# Patient Record
Sex: MALE | Race: WHITE | Employment: OTHER | URBAN - METROPOLITAN AREA
[De-identification: names, ages, dates, MRNs, and addresses within clinical notes are randomized per-mention and may not be internally consistent; named-entity substitution may affect disease eponyms.]

---

## 2021-06-01 ENCOUNTER — OFFICE VISIT (OUTPATIENT)
Dept: ORTHOPEDIC SURGERY | Age: 71
End: 2021-06-01
Payer: COMMERCIAL

## 2021-06-01 VITALS — BODY MASS INDEX: 29.35 KG/M2 | WEIGHT: 205 LBS | HEIGHT: 70 IN

## 2021-06-01 DIAGNOSIS — G89.29 CHRONIC RIGHT SHOULDER PAIN: Primary | ICD-10-CM

## 2021-06-01 DIAGNOSIS — M25.511 CHRONIC RIGHT SHOULDER PAIN: Primary | ICD-10-CM

## 2021-06-01 DIAGNOSIS — M25.511 ACUTE PAIN OF RIGHT SHOULDER: ICD-10-CM

## 2021-06-01 PROBLEM — M79.601 PAIN OF RIGHT UPPER EXTREMITY: Status: ACTIVE | Noted: 2020-05-19

## 2021-06-01 PROBLEM — E11.69 HYPERLIPIDEMIA ASSOCIATED WITH TYPE 2 DIABETES MELLITUS (HCC): Status: ACTIVE | Noted: 2019-09-10

## 2021-06-01 PROBLEM — E11.9 TYPE 2 DIABETES MELLITUS WITHOUT COMPLICATION, WITHOUT LONG-TERM CURRENT USE OF INSULIN (HCC): Status: ACTIVE | Noted: 2018-11-05

## 2021-06-01 PROBLEM — E78.5 HYPERLIPIDEMIA ASSOCIATED WITH TYPE 2 DIABETES MELLITUS (HCC): Status: ACTIVE | Noted: 2019-09-10

## 2021-06-01 PROBLEM — M50.30 DEGENERATIVE DISC DISEASE, CERVICAL: Status: ACTIVE | Noted: 2018-01-01

## 2021-06-01 PROCEDURE — 99203 OFFICE O/P NEW LOW 30 MIN: CPT | Performed by: ORTHOPAEDIC SURGERY

## 2021-06-01 RX ORDER — BLOOD SUGAR DIAGNOSTIC
STRIP MISCELLANEOUS
COMMUNITY
Start: 2021-03-22

## 2021-06-01 RX ORDER — ATORVASTATIN CALCIUM 10 MG/1
TABLET, FILM COATED ORAL
COMMUNITY
Start: 2020-11-02 | End: 2021-06-01

## 2021-06-01 RX ORDER — LANCETS
EACH MISCELLANEOUS
COMMUNITY
Start: 2021-03-22

## 2021-06-01 RX ORDER — LISINOPRIL 2.5 MG/1
2.5 TABLET ORAL DAILY
COMMUNITY
Start: 2021-05-03

## 2021-06-01 NOTE — PROGRESS NOTES
[facility] and [facility]  Office Visit  Shoulder Pain  Date:  2021    Name:  Mac Snellen  Address:  [address] [place] [address]    :  1950      Age:   65 y.o.    SSN:  [id number]      Medical Record Number:  <[id number]>    Chief Complaint:    Right shoulder Pain    HPI:   Mac Snellen is a 65 y.o. left hand dominant male who presents today for new patient evaluation regarding his right shoulder. 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 The patient reports that about a year ago he was carrying a heavy [place] countertop and felt a pain to his right shoulder. He was evaluated at the [facility] a few months ago and a corticosteroid injection was performed which did improve some of his symptoms. They told him that he will either need to live with this problem or get surgical intervention. The patient was then working yesterday and pulled his arm back during a job and felt immediate pain to his right shoulder that was worse than it was before. Since then he has had continuing pain and weakness to the shoulder that was not present prior to this injury yesterday. Prior to that injury he was able to sleep okay and he was able to do a lot of work with the shoulder below shoulder height, only having problems with work above shoulder height. Now he has problems with any motion of his shoulder in any plane. Pain Assessment  Location of Pain: Shoulder  Location Modifiers: Right  Severity of Pain: 0  Quality of Pain: Sharp  Duration of Pain: Persistent  Frequency of Pain: Intermittent  Aggravating Factors:  Other (Comment)  Limiting Behavior: Yes  Relieving Factors: Rest, Other (Comment)  Result of Injury: Yes  Work-Related Injury: No  Are there other pain locations you wish to document?: No    Past History:  Past Medical History:   Diagnosis Date    Diabetes [facility])        Past Surgical History:   Procedure Laterality Date    CARPAL TUNNEL RELEASE         Social History     Tobacco Use    Smoking status: Never Smoker    Smokeless tobacco: Never Used   Substance Use Topics    Alcohol use: Yes    Drug use: Never        Family History:  family history is not on file. Current Outpatient Medications:     metFORMIN (GLUCOPHAGE) 500 MG tablet, , Disp: , Rfl:     lisinopril (PRINIVIL;ZESTRIL) 2.5 MG tablet, , Disp: , Rfl:     Aspirin Buf,CaCarb-MgCarb-MgO, 81 MG TABS, Take 81 mg by mouth daily, Disp: , Rfl:     ACCU-CHEK GUIDE strip, , Disp: , Rfl:     Accu-Chek Softclix Lancets MISC, , Disp: , Rfl:     Allergies   Allergen Reactions    Penicillins          Review of Systems: A 14 point review of systems available in the scanned medical record as documented by the patient on 6/1/21. Today's review pertinent items are noted in HPI. Musculoskeletal ROS: positive for - pain in right shoulder        Physical Exam:  Ht 5' 10\" (1.778 m)   Wt 205 lb (93 kg)   BMI 29.41 kg/m²       General: No acute distress, well nourished  CV: No obvious peripheral edema. Normal peripheral pulses  Neuro: Alert & oriented x 3  Psych: Normal mood and affect    Right shoulder exam    Inspection:  Held in a normal posture. Normal contour at the acromioclavicular joint. No swelling, ecchymosis, or erythema about the shoulder. No atrophy appreciated. No scapular winging. Palpation:  No subacromial crepitus. No tenderness of the AC joint. Tenderness to the greater tuberosity into the bicipital groove. Range of Motion: Active ROM: Forward flexion 20 degrees, passive 140, external rotation 20 degrees, passive 45, internal rotation T10. Strength: 3 out of 5 supraspinatus, 4 out of 5 infraspinatus, 5 out of 5 subscapularis    Stability: No anterior instability. No posterior instability. Special Tests: Impingement findings are negative. Labral findings are negative. Speed sign and Yergason signs are both negative. Crossover sign is negative. Belly press sign is negative. Lift off sign is negative. Other findings: The skin is warm dry and well perfused. 2+ radial pulse. Sensation is intact to light touch over the deltoid. Left comparison shoulder exam    Inspection:  Held in a normal posture. Normal contour at the acromioclavicular joint. No swelling, ecchymosis, or erythema about the shoulder. No atrophy appreciated. No scapular winging. Palpation:  No subacromial crepitus. No tenderness of the AC joint. No greater tuberosity tenderness. No tenderness in the bicipital groove. Range of Motion: Active ROM forward flexion 150 degrees, external rotation 45 degrees, internal rotation T10. Normal scapulothoracic rhythm. Strength:  Normal supraspinatus, infraspinatus, and subscapularis muscle strength. Stability: No anterior instability. No posterior instability. Special Tests: Impingement findings are negative. Labral findings are negative. Speed sign and Yergason signs are both negative. Crossover sign is negative. Belly press sign is negative. Lift off sign is negative. Other findings: The skin is warm dry and well perfused. 2+ radial pulse. Sensation is intact to light touch over the deltoid. Radiographic:  X-rays obtained and reviewed in office, reviewed and interpreted by me today:    Findings:   Views: 3 views right shoulder  Weight bearing: No  Findings: 3 views of the right shoulder taken in the office today demonstrate no acute osseous abnormalities. There is well-maintained joint space to the glenohumeral joint without signs of arthritic change. There is mild to moderate AC joint arthritis. Appropriate acromiohumeral interval noted. Previous comparison films: None    Impression:  1. Mild to moderate right shoulder AC joint arthritis    Assessment:  Cesia Wilson is a 65 y.o. male presenting with:  1.  Right shoulder pain and weakness, concern for acute on chronic rotator cuff full-thickness tear    Impression:  Encounter Diagnoses   Name Primary?  Chronic right shoulder pain Yes    Acute pain of right shoulder        Office Procedures:  Orders Placed This Encounter   Procedures    XR SHOULDER RIGHT (MIN 2 VIEWS)     Standing Status:   Future     Number of Occurrences:   1     Standing Expiration Date:   6/1/2022    MRI SHOULDER RIGHT WO CONTRAST     Standing Status:   Future     Standing Expiration Date:   6/1/2022     Order Specific Question:   Reason for exam:     Answer:   Jeananne Goltz       Plan: We had a candid discussion with Marcin Sharp. Given the patient's history of an acute injury yesterday and clinical exam findings suspicious for full-thickness rotator cuff tear, which is different than what his shoulder was 2 days ago, we are concerned for an acute on chronic rotator cuff tear. This may be a fixable tear given the good status of his overall joint. Therefore we recommend he get an MRI to evaluate his right shoulder. We will try to get it set up at the [place]. Follow-up with us next week to go over the MRI results. If there is a full-thickness tear in the muscle is not atrophied he may be a good candidate for arthroscopic repair. Joaquim Armendariz,   [facility] Clinical Fellow    The encounter with Marcin Sharp was supervised by Dr. Joel Horn who personally examined the patient and reviewed the plan. This dictation was performed with a verbal recognition program (DRAGON) and it was checked for errors. It is possible that there are still dictated errors within this office note. If so, please bring any errors to my attention for an addendum. All efforts were made to ensure that this office note is accurate.   ___________________  I was physically present and personally supervised the Orthopaedic Sports Medicine Fellow in the evaluation and development of a treatment plan for this patient. I personally interviewed the patient and performed a physical examination.  In addition,

## 2021-06-04 ENCOUNTER — TELEPHONE (OUTPATIENT)
Dept: ORTHOPEDIC SURGERY | Age: 71
End: 2021-06-04

## 2021-06-04 NOTE — TELEPHONE ENCOUNTER
Test Results     Type of Test: MRI RT SHOULDER  Date of Test: Rekha 3, 2021  Location of Test: [id number]  Patient Contact Number: [phone]    PATIENT CALLING REGARDING SCHEDULING APPOINTMENT FOR TR MRI  RT SHOULDER    1ST AVAIL IN [facility] July 13, 2021 AND [place] June 22, 2021. PATIENT STATED THAT HE THINK DR. GEE WANTS TO SEE HIM SOONER.     PLEASE CALL BACK PATIENT AT THE ABOVE NUMBER

## 2021-06-17 ENCOUNTER — OFFICE VISIT (OUTPATIENT)
Dept: ORTHOPEDIC SURGERY | Age: 71
End: 2021-06-17
Payer: COMMERCIAL

## 2021-06-17 VITALS — HEIGHT: 70 IN | WEIGHT: 205 LBS | BODY MASS INDEX: 29.35 KG/M2

## 2021-06-17 DIAGNOSIS — M25.511 CHRONIC RIGHT SHOULDER PAIN: Primary | ICD-10-CM

## 2021-06-17 DIAGNOSIS — M25.511 ACUTE PAIN OF RIGHT SHOULDER: ICD-10-CM

## 2021-06-17 DIAGNOSIS — M75.121 NONTRAUMATIC COMPLETE TEAR OF RIGHT ROTATOR CUFF: ICD-10-CM

## 2021-06-17 DIAGNOSIS — G89.29 CHRONIC RIGHT SHOULDER PAIN: Primary | ICD-10-CM

## 2021-06-17 PROCEDURE — 99214 OFFICE O/P EST MOD 30 MIN: CPT | Performed by: ORTHOPAEDIC SURGERY

## 2021-06-17 NOTE — PROGRESS NOTES
[facility]  History and Physical  Shoulder Pain    Date:  2021    Name:  Sheridan Rivero  Address:  [address] [place] [address]    :  1950      Age:   65 y.o.    SSN:  [id number]      Medical Record Number:  [id number]    Reason for Visit:    Shoulder Pain (F/U RIGHT SHOULDER MRI)      HPI:   Sheridan Rivero is a 65 y.o. male who presents to our office today for follow up of the right shoulder pain. Patient presents today review the results of a more recent MRI that he completed which was done to evaluate for the muscle atrophy of the rotator cuff. Fortunately he has been dealing with this right shoulder for nearly a year. He injured his right shoulder after he was carrying a heavy [place] countertop when he felt pain in the right shoulder. He was evaluated at the [place] initially and was undergoing conservative management. He eventually did get an MRI in November of last year which did show full-thickness rotator cuff tear. Today he reports he continues to have pain and weakness with any overhead activities, lifting and carrying. He also has pain at nighttime when he sleeping. He denies any new injury since his last visit with us. Of note the patient is currently on chronic oral prednisone for multiple joint pain and is tapering down monthly by 2 mg. He is currently on 12.5 mg dosing. Pain Assessment  Location of Pain: Shoulder  Location Modifiers: Right  Severity of Pain: 2  Quality of Pain: Aching  Duration of Pain: Persistent  Frequency of Pain: Constant  Aggravating Factors:  Other (Comment) (GENERAL USE)  Limiting Behavior: Yes  Relieving Factors: Rest  Result of Injury: No  Work-Related Injury: No  Are there other pain locations you wish to document?: No    Patient has had no medical changes since last evaluated        Review of Systems:  A 14 point review of systems available in the scanned medical record as documented by the patient on/1/2021. The review is negative with the exception of those things mentioned in the History of Present Illness and Past Medical History. Past Medical History:  Patient's medications, allergies, past medical, surgical, social and family histories were reviewed and updated as appropriate. Allergies: Allergies   Allergen Reactions    Penicillins        Physical Exam:  There were no vitals filed for this visit. General: Saurav Garcia is a healthy and well appearing 65 y.o. male who is sitting comfortably in our office in acute distress. Skin:  There are no skin lesions, cellulitis, or extreme edema. The patient has warm and well-perfused Bilateral upper extremities with brisk capillary refill. Eyes: Extra-ocular muscles intact  Mouth: Oral mucosa moist. No perioral lesions  Pulm: Respirations unlabored and regular. Neuro: Alert and oriented x3    Right shoulder Exam:  Inspection:  No gross deformities, no signs of infection. Palpation: He has tenderness over the rotator cuff footprint    Active Range of Motion: Forward elevation of 140, abduction of 40, external rotation with elbow at the side 40, internal rotation to the back is T6    Passive Range of Motion: Passively forward elevation can be further increased to 150. Strength: External rotation with resistance with elbow at the side 4/5, internal rotation with resistance with elbow at the side +4/5, 4/5 champagne test    Special Tests:   No Erich muscle deformity. Neurovascular: Sensation to light touch is intact, no motor deficits, palpable radial pulses 2+    Additional Examinations:    Examination of the contralateral extremity does not show any tenderness, deformity or injury. Range of motion is unremarkable. There is no gross instability. There are no rashes, ulcerations or lesions.  Strength and tone are normal.      Radiographic:  MRI right shoulder 6/4/2021  FINDINGS:  No acute fracture or contusion. Ashish Fang high-riding of humeral head.  Chronic near    circumferential labral tear, no paralabral cyst.  No substantive joint effusion.       Chronic 2.5 x 3.5 cm full-thickness insertional tear of anterior supraspinatus tendon.     Tendinopathy with complex interstitial strain of infraspinatus tendon and muscle.  No hematoma.     Moderate tendinopathy of subscapularis, no tear.  Moderate tendinopathy of arcuate segment of    biceps long head tendon, no discrete tear.       Moderate hypertrophic AC arthropathy.  Acromion type 2.  Hypertrophy of coracoacromial    ligament.       Mild fatty infiltration of supraspinatus muscle.  Moderate fatty infiltration of infraspinatus    muscle.  No acute muscle strain.       CONCLUSION:   1. Chronic 2.5 x 3.5 cm full-thickness insertional tear of anterior supraspinatus tendon.     Severe tendinopathy with complex interstitial tear of infraspinatus tendon and muscle. 2. Moderate tendinopathy of subscapularis and arcuate segment biceps long head tendon, no tear. 3. Moderate hypertrophic AC arthropathy. 4. Mild glenohumeral arthrosis with spurring and near circumferential labral tear.             Assessment:  Dariana Osorio is a 65 y.o. male with right shoulder pain and weakness related to a full-thickness rotator cuff tear with mild muscle atrophy based on his MRI. Impression:  Encounter Diagnoses   Name Primary?  Chronic right shoulder pain Yes    Acute pain of right shoulder     Nontraumatic complete tear of right rotator cuff        Office Procedures:  No orders of the defined types were placed in this encounter. Plan: We were able to review the most recent MRI with Dariana Osorio today. At this point we recommend that the patient has a rotator cuff tear that can be repaired with a right shoulder arthroscopy.   Unfortunately the patient is currently on 12.5 mg of oral prednisone daily and this will need to be tapered down to under 5 mg for him to undergo the surgery. Patient will speak to his physician at the [address] and will find out how long this prednisone taper will last.  We recommend that he return back to our office in 6 weeks and start planning on his surgery 3 months from now. He was agreeable to the above plan. All his questions were fully answered today. Greater than 30 minutes were expended during all aspects of today's visit. 6/17/2021  11:34 AM      Lianet Terry PA-C  Orthopaedic Sports Medicine Physician Assistant    During this examination, I, Lianet Terry PA-C, functioned as a scribe for Dr. Suzy Nicole. This dictation was performed with a verbal recognition program (DRAGON) and it was checked for errors. It is possible that there are still dictated errors within this office note. If so, please bring any errors to my attention for an addendum. All efforts were made to ensure that this office note is accurate.  _________________  I, Dr. Suzy Nicole, personally performed the services described in this documentation as described by Lianet Terry PA-C in my presence, and it is both accurate and complete. Kwame Hannah MD, PhD  6/17/2021

## 2021-07-29 ENCOUNTER — OFFICE VISIT (OUTPATIENT)
Dept: ORTHOPEDIC SURGERY | Age: 71
End: 2021-07-29
Payer: COMMERCIAL

## 2021-07-29 VITALS — WEIGHT: 205 LBS | BODY MASS INDEX: 29.35 KG/M2 | HEIGHT: 70 IN

## 2021-07-29 DIAGNOSIS — S46.011A TRAUMATIC COMPLETE TEAR OF RIGHT ROTATOR CUFF, INITIAL ENCOUNTER: Primary | ICD-10-CM

## 2021-07-29 DIAGNOSIS — M25.511 RIGHT SHOULDER PAIN, UNSPECIFIED CHRONICITY: ICD-10-CM

## 2021-07-29 PROCEDURE — 99214 OFFICE O/P EST MOD 30 MIN: CPT | Performed by: ORTHOPAEDIC SURGERY

## 2021-07-29 NOTE — PROGRESS NOTES
daily, Disp: , Rfl:     ACCU-CHEK GUIDE strip, , Disp: , Rfl:     Accu-Chek Softclix Lancets MISC, , Disp: , Rfl:       Allergies   Allergen Reactions    Penicillins          Review of Systems: A 14 point review of systems was completed by the patient on 6/1/21 and is available in the media section of the scanned medical record and was reviewed today  The review is negative with the exception of those things mentioned in the HPI    Patient has had no medical changes since last evaluated        Physical Exam:  Ht 5' 10\" (1.778 m)   Wt 205 lb (93 kg)   BMI 29.41 kg/m²       General: No acute distress, well nourished  CV: No obvious peripheral edema. Normal peripheral pulses  Resp: nonlabored respirations, symmetrical chest expansion  Neuro: Alert & oriented x 3  Psych: Appropriate mood and affect    R Shoulder Exam:  Inspection: No gross deformities, no signs of infection. Palpation: No tenderness to palpation  Active Range of Motion: Forward Elevation 160, Abduction 160, External Rotation 45, Internal Rotation t12  Passive Range of Motion: No restrictions  Strength:  External Rotation 4-/5, Internal Rotation 4+/5  Special Tests:  No Erich muscle deformity. 4/5 Twyla test. 4/5 Champagne toast test  Neurovascular: Sensation to light touch is intact, no motor deficits, palpable radial pulses 2+      Contralateral L Shoulder Exam:  Inspection: No gross deformities, no signs of infection. Palpation: No tenderness to palpation  Active Range of Motion: Forward Elevation 160, Abduction 160, External Rotation 45, Internal Rotation t12  Passive Range of Motion: No restrictions  Strength:  External Rotation 5/5, Internal Rotation 5/5  Special Tests:  No Erich muscle deformity. Neurovascular: Sensation to light touch is intact, no motor deficits, palpable radial pulses 2+             Radiographic:  MRI R shoulder 6/4/21    1.  Chronic 2.5 x 3.5 cm full-thickness insertional tear of anterior supraspinatus tendon.   Severe tendinopathy with complex interstitial tear of infraspinatus tendon and muscle. 2. Moderate tendinopathy of subscapularis and arcuate segment biceps long head tendon, no tear. 3. Moderate hypertrophic AC arthropathy. 4. Mild glenohumeral arthrosis with spurring and near circumferential labral tear. Assessment:  Sheridan Thibodeaux is a 65 y.o. male with right shoulder rotator cuff tear. Probably sustained 1 year ago after a traumatic event. He is currently on an extended steroid taper for polymyalgia rheumatica which is delaying his surgery    Office Procedures:  No orders of the defined types were placed in this encounter. Plan:     - We discussed the timeline to definitive treatment is considerable length today with Sheridan Thibodeaux. -Given that this is a full-thickness tear and he has associated weakness and pain, he would benefit from rotator cuff repair. He is currently on prednisone 10 mg daily for polymyalgia rheumatica. He will transition to 7.5 mg daily for 2 months and then transition to 5 mg in November timeframe. Once he transitions to 5 mg we will proceed with rotator cuff repair to prevent further muscle atrophy and retraction of the tendon which would prevent repair  -He has been treated by a [place] physician for his polymyalgia rheumatica  -He will return back in October timeframe for surgical planning.  -He lives alone. Discussed that he would require help from his sisters during the immediate postoperative period while he is in a sling for the first 3 weeks  -We will plan on doing physical therapy with [facility]    Total time spent for evaluation, education, and development of treatment plan: 30 minutes    Luke Garrett MD  Orthopaedic Fellow  [facility] and [facility]      The encounter with Sheridan Thibodeaux was supervised by Dr Elian Sinha  who personally examined the patient and reviewed the plan.      This dictation was performed with a verbal recognition program (DRAGON) and it was checked for errors. It is possible that there are still dictated errors within this office note. If so, please bring any errors to my attention for an addendum. All efforts were made to ensure that this office note is accurate.   ____________  I was physically present and personally supervised the Orthopaedic Sports Medicine Fellow in the evaluation and development of a treatment plan for this patient. I personally interviewed the patient and performed a physical examination. In addition, I discussed the patient's condition and treatment options with them. I have also reviewed and agree with the past medical, family and social history unless otherwise noted. All of the patient's questions were answered. Kwame Gordon MD, PhD  7/29/2021

## 2021-10-26 ENCOUNTER — TELEPHONE (OUTPATIENT)
Dept: ORTHOPEDIC SURGERY | Age: 71
End: 2021-10-26

## 2021-10-26 ENCOUNTER — OFFICE VISIT (OUTPATIENT)
Dept: ORTHOPEDIC SURGERY | Age: 71
End: 2021-10-26
Payer: COMMERCIAL

## 2021-10-26 VITALS — BODY MASS INDEX: 29.35 KG/M2 | HEIGHT: 70 IN | WEIGHT: 205 LBS

## 2021-10-26 DIAGNOSIS — M25.532 LEFT WRIST PAIN: ICD-10-CM

## 2021-10-26 DIAGNOSIS — S46.011D TRAUMATIC COMPLETE TEAR OF RIGHT ROTATOR CUFF, SUBSEQUENT ENCOUNTER: Primary | ICD-10-CM

## 2021-10-26 PROCEDURE — 99214 OFFICE O/P EST MOD 30 MIN: CPT | Performed by: ORTHOPAEDIC SURGERY

## 2021-10-26 PROCEDURE — L3670 SO ACRO/CLAV CAN WEB PRE OTS: HCPCS | Performed by: ORTHOPAEDIC SURGERY

## 2021-10-26 RX ORDER — ROSUVASTATIN CALCIUM 10 MG/1
10 TABLET, COATED ORAL DAILY
COMMUNITY
Start: 2021-10-15

## 2021-10-26 NOTE — PROGRESS NOTES
[address] [facility]  Office Visit  Follow up  Date:  10/26/2021    Name:  Duane Chad  Address:  [address] [place] [address]    :  1950      Age:   65 y.o.    SSN:  [id number]      Medical Record Number:  <[id number]>    Chief Complaint:    Follow up for R shoulder  HPI:   Duane Chad is a 65 y.o. male who is following up for his right shoulder. He has a right shoulder full-thickness rotator cuff tear confirmed on MRI. He is currently on extended prednisone taper for polymyalgia rheumatica. He still have shoulder pain 4/10 in severity. He has difficulty in wearing his shirt and doing daily activities. He has difficulty sleeping at night at his right shoulder. He had received steroid injection in his shoulder 3 months ago, and his symptoms briefly improved but returned back again. Patient also had left wrist injury 4 days ago. He sustained pain at the ulnar aspect of his wrist. Pain increased with any twisting movement at his wrist. He is taking pain medication and using wrist support for that. He denies any new numbness, tingling, fevers, chills, chest pain, shortness of breath, or any other new significant symptoms. Pain Assessment  Location of Pain: Shoulder  Location Modifiers: Right  Severity of Pain: 0  Frequency of Pain: Intermittent  Aggravating Factors: Other (Comment)  Limiting Behavior: Some  Relieving Factors: Rest, Ice, Exercise  Work-Related Injury: No  Are there other pain locations you wish to document?: No    Past History:  Past Medical History:   Diagnosis Date    Diabetes ([address]        Past Surgical History:   Procedure Laterality Date    CARPAL TUNNEL RELEASE         Social History     Tobacco Use    Smoking status: Never Smoker    Smokeless tobacco: Never Used   Substance Use Topics    Alcohol use: Yes    Drug use: Never        Family History:  family history is not on file.          Current Outpatient Medications:     rosuvastatin (CRESTOR) 10 MG tablet, , Disp: , Rfl:     metFORMIN (GLUCOPHAGE) 500 MG tablet, , Disp: , Rfl:     lisinopril (PRINIVIL;ZESTRIL) 2.5 MG tablet, , Disp: , Rfl:     Aspirin Buf,CaCarb-MgCarb-MgO, 81 MG TABS, Take 81 mg by mouth daily, Disp: , Rfl:     ACCU-CHEK GUIDE strip, , Disp: , Rfl:     Accu-Chek Softclix Lancets MISC, , Disp: , Rfl:       Allergies   Allergen Reactions    Penicillins          Review of Systems: A 14 point review of systems was completed by the patient on 6/1/21 and is available in the media section of the scanned medical record and was reviewed today  The review is negative with the exception of those things mentioned in the HPI    Patient has had no medical changes since last evaluated        Physical Exam:  Ht 5' 10\" (1.778 m)   Wt 205 lb (93 kg)   BMI 29.41 kg/m²       General: No acute distress, well nourished  CV: No obvious peripheral edema. Normal peripheral pulses  Resp: nonlabored respirations, symmetrical chest expansion  Neuro: Alert & oriented x 3  Psych: Appropriate mood and affect    R Shoulder Exam:  Inspection: No gross deformities, no signs of infection. Palpation: No tenderness to palpation on biceps or AC joint  Active Range of Motion: Forward Elevation 160, Abduction 160, External Rotation 45, Internal Rotation t10  Passive Range of Motion: No restrictions  Strength:  External Rotation 4/5, Internal Rotation 4+/5  Special Tests:  No Erich muscle deformity. 4/5 Twyla test. 4/5 Champagne toast test with pain  Neurovascular: Sensation to light touch is intact, no motor deficits, palpable radial pulses 2+      Radiographic:  MRI R shoulder 6/4/21    1. Chronic 2.5 x 3.5 cm full-thickness insertional tear of anterior supraspinatus tendon.     Severe tendinopathy with complex interstitial tear of infraspinatus tendon and muscle. 2. Moderate tendinopathy of subscapularis and arcuate segment biceps long head tendon, no tear.    3. Moderate hypertrophic AC arthropathy. 4. Mild glenohumeral arthrosis with spurring and near circumferential labral tear. Left wrist x-ray with 3 views including true AP, oblique and lateral views:  X-ray obtained and reviewed at the clinic,  No fractures or dislocation with good alignment of his bone      Assessment:  Maribel Piedra is a 65 y.o. male with large/massive rotator cuff tear for arthroscopic rotator cuff repair and patch augmentation. Office Procedures:  Orders Placed This Encounter   Procedures    XR WRIST LEFT (MIN 3 VIEWS)     X-ray Wrist Left Carpal Tunnel View     Standing Status:   Future     Number of Occurrences:   1     Standing Expiration Date:   10/26/2022     Order Specific Question:   Reason for exam:     Answer:   pain       Plan:     Maribel Piedra has right shoulder full-thickness rotator cuff tear associated with weakness and pain. His symptoms does not improve even though he tried all the conservative treatments, including steroids injection. Patient currently on oral steroids for his polymyalgia rheumatica. He is on 7.5 mg of oral prednisolone and he will be on 5 mg prednisone next week. At that point he will benefit from right shoulder scope, debridement, rotator cuff repair with patch augmentation. He plans to do this in November. Risks, benefits and potential complications of arthroscopic shoulder surgery were discussed with the patient. Risks discussed include but are not limited to bleeding, infection, anesthetic risk, injury to nerves and blood vessels, deep vein thrombosis, residual stiffness and weakness, and the need for revision surgery. The patient also understands that anesthetic risks include cardiopulmonary issues, drug reactions and even death. The patient voices an understanding of the importance of physical therapy and home exercises after surgery. All questions were answered and written informed consent for surgery was obtained today.      He lives alone and we discussed that he would require help from his sisters during the immediate postoperative period. Especially while he is in a sling for the first 3 weeks  -He plans to do his physical therapy with Felicia [facility] in [facility]. All of the patient questions and concerns regarding the surgery, and the postoperative course were answered today. He is in agreement with this plan. Keara Malloy MD  Clinical Fellow at [place] [address]      10/26/2021  11:45 AM        During this examination, I, Keara Malloy MD functioned as a scribe for Dr. Dandy Hurd. This dictation was performed with a verbal recognition program (DRAGON) and it was checked for errors. It is possible that there are still dictated errors within this office note. If so, please bring any errors to my attention for an addendum. All efforts were made to ensure that this office note is accurate.  ___________________  I was physically present and personally supervised the Orthopaedic Sports Medicine Fellow in the evaluation and development of a treatment plan for this patient. I personally interviewed the patient and performed a physical examination. In addition, I discussed the patient's condition and treatment options with them. I have also reviewed and agree with the past medical, family and social history unless otherwise noted. All of the patient's questions were answered. Kwame Booker MD, PhD  10/26/2021

## 2021-10-29 NOTE — TELEPHONE ENCOUNTER
Auth: # [id number]    Date: 11/11/2021 thru 12/11/2021   Type of SX:  Outpatient  Location: [facility]  CPT: 44053, 90583, 91449   DX Code: Y43.174V, M25.532  SX area:  shoulder  Insurance: [facility]

## 2021-11-03 NOTE — PROGRESS NOTES
Hoang Cr    Age 65 y.o.    male    1950    MRN [id number]    11/11/2021  Arrival Time_____________  OR Time____________[address]     Procedure(s):  VIDEO ARTHROSCOPY RIGHT SHOULDER, DEBRIDEMENT, DECOMPRESSION, ROTATOR CUFF REPAIR WITH PATCH AUGMENTATION                      General    Surgeon(s):  Axel Ribeiro, MD       Phone [phone] ([place])     [facility] [place]  Cell         Work  _____________________________________________________________________  _____________________________________________________________________  _____________________________________________________________________  _____________________________________________________________________  _____________________________________________________________________    PCP _____________________________ Phone_________________     H&P__________________Bringing      Chart            Epic   DOS      Called________  EKG__________________Bringing      Chart            Epic   DOS      Called________  LAB__________________ Bringing      Chart            Epic   DOS      Called________  Cardiac Clearance_______Bringing      Chart            Epic      DOS      Called________    Cardiologist________________________ Phone___________________________    ? Christianity concerns / Waiver on Chart            [employer]________________  ? Pre-op Instructions Given [place]          _________________________________  ? Directions to Surgery Center                          _________________________________  ? Transportation Home_______________      __________________________________  ?  Crutches/Walker__________________        __________________________________    ________Pre-op Orders   _______Transcribed    _______Wt.  ________Pharmacy          _______SCD  ______VTE     ______TED Rhea Desmond  _______  Surgery Consent    _______  Anesthesia Consent         COVID DATE______________LOCATION________________ RESULT__________

## 2021-11-08 ENCOUNTER — HOSPITAL ENCOUNTER (OUTPATIENT)
Age: 71
Discharge: HOME | End: 2021-11-08
Payer: COMMERCIAL

## 2021-11-08 LAB — SARS-COV-2, PCR: NOT DETECTED

## 2021-11-08 PROCEDURE — U0003 INFECTIOUS AGENT DETECTION BY NUCLEIC ACID (DNA OR RNA); SEVERE ACUTE RESPIRATORY SYNDROME CORONAVIRUS 2 (SARS-COV-2) (CORONAVIRUS DISEASE [COVID-19]), AMPLIFIED PROBE TECHNIQUE, MAKING USE OF HIGH THROUGHPUT TECHNOLOGIES AS DESCRIBED BY CMS-2020-01-R: HCPCS

## 2021-11-08 PROCEDURE — U0005 INFEC AGEN DETEC AMPLI PROBE: HCPCS

## 2021-11-08 RX ORDER — ASPIRIN 81 MG/1
81 TABLET ORAL DAILY
COMMUNITY

## 2021-11-08 RX ORDER — PREDNISONE 1 MG/1
2.5 TABLET ORAL DAILY
COMMUNITY

## 2021-11-08 NOTE — PROGRESS NOTES
Obstructive Sleep Apnea (CHAO) Screening     Patient:  Natan Mujica    YOB: 1950      Medical Record #:  [id number]                     Date:  11/8/2021     1. Are you a loud and/or regular snorer? []  Yes       [x] No    2. Have you been observed to gasp or stop breathing during sleep? []  Yes       [x] No    3. Do you feel tired or groggy upon awakening or do you awaken with a headache?           []  Yes       [] No    4. Are you often tired or fatigued during the wake time hours? []  Yes       [] No    5. Do you fall asleep sitting, reading, watching TV or driving? []  Yes       [] No    6. Do you often have problems with memory or concentration? []  Yes       [] No    **If patient's score is ? 3 they are considered high risk for CHAO. An Anesthesia provider will evaluate the patient and develop a plan of care the day of surgery. Note:  If the patient's BMI is more than 35 kg m¯² , has neck circumference > 40 cm, and/or high blood pressure the risk is greater (© American Sleep Apnea Association, 2006).

## 2021-11-08 NOTE — PROGRESS NOTES
Preoperative Screening for Elective Surgery/Invasive Procedures While COVID-19 present in the community     Have you had any of the following symptoms? No  o Fever, chills  o Cough  o Shortness of breath  o Muscle aches/pain  o Diarrhea  o Abdominal pain, nausea, vomiting  o Loss or decrease in taste and / or smell   Risk of Exposure  o Have you recently been hospitalized for COVID-19 or flu-like illness, if so when? No  o Recently diagnosed with COVID-19, if so when? No  o Recently tested for COVID-19, if so when? 11/8/21  o Have you been in close contact with a person or family member who currently has or recently had COVID-23? If yes, when and in what context? No  o Do you live with anybody who in the last 14 days has had fever, chills, shortness of breath, muscle aches, flu-like illness? No  o Do you have any close contacts or family members who are currently in the hospital for COVID-19 or flu-like illness? No  If yes, assess recent close contact with this person. Indicate if the patient has a positive screen by answering yes to one or more of the above questions. Patients who test positive or screen positive prior to surgery or on the day of surgery should be evaluated in conjunction with the surgeon/proceduralist/anesthesiologist to determine the urgency of the procedure.

## 2021-11-10 ENCOUNTER — ANESTHESIA EVENT (OUTPATIENT)
Dept: OPERATING ROOM | Age: 71
End: 2021-11-10
Payer: COMMERCIAL

## 2021-11-11 ENCOUNTER — ANESTHESIA (OUTPATIENT)
Dept: OPERATING ROOM | Age: 71
End: 2021-11-11
Payer: COMMERCIAL

## 2021-11-11 ENCOUNTER — HOSPITAL ENCOUNTER (OUTPATIENT)
Age: 71
Setting detail: OUTPATIENT SURGERY
Discharge: HOME | End: 2021-11-11
Attending: ORTHOPAEDIC SURGERY | Admitting: ORTHOPAEDIC SURGERY
Payer: COMMERCIAL

## 2021-11-11 VITALS
TEMPERATURE: 97.8 F | HEIGHT: 70 IN | BODY MASS INDEX: 29.35 KG/M2 | OXYGEN SATURATION: 95 % | RESPIRATION RATE: 16 BRPM | DIASTOLIC BLOOD PRESSURE: 86 MMHG | WEIGHT: 205 LBS | SYSTOLIC BLOOD PRESSURE: 136 MMHG | HEART RATE: 74 BPM

## 2021-11-11 VITALS
OXYGEN SATURATION: 98 % | TEMPERATURE: 98.2 F | SYSTOLIC BLOOD PRESSURE: 130 MMHG | DIASTOLIC BLOOD PRESSURE: 89 MMHG | RESPIRATION RATE: 24 BRPM

## 2021-11-11 DIAGNOSIS — Z98.890 S/P ARTHROSCOPY OF RIGHT SHOULDER: Primary | ICD-10-CM

## 2021-11-11 LAB
GLUCOSE BLD-MCNC: 150 MG/DL (ref 70–99)
GLUCOSE BLD-MCNC: 150 MG/DL (ref 70–99)
GLUCOSE BLD-MCNC: 167 MG/DL (ref 70–99)
PERFORMED ON: ABNORMAL

## 2021-11-11 PROCEDURE — 2720000010 HC SURG SUPPLY STERILE: Performed by: ORTHOPAEDIC SURGERY

## 2021-11-11 PROCEDURE — 6360000002 HC RX W HCPCS: Performed by: ANESTHESIOLOGY

## 2021-11-11 PROCEDURE — 7100000011 HC PHASE II RECOVERY - ADDTL 15 MIN: Performed by: ORTHOPAEDIC SURGERY

## 2021-11-11 PROCEDURE — 3700000000 HC ANESTHESIA ATTENDED CARE: Performed by: ORTHOPAEDIC SURGERY

## 2021-11-11 PROCEDURE — 3600000014 HC SURGERY LEVEL 4 ADDTL 15MIN: Performed by: ORTHOPAEDIC SURGERY

## 2021-11-11 PROCEDURE — 2580000003 HC RX 258: Performed by: ORTHOPAEDIC SURGERY

## 2021-11-11 PROCEDURE — 2709999900 HC NON-CHARGEABLE SUPPLY: Performed by: ORTHOPAEDIC SURGERY

## 2021-11-11 PROCEDURE — 64415 NJX AA&/STRD BRCH PLXS IMG: CPT | Performed by: ANESTHESIOLOGY

## 2021-11-11 PROCEDURE — 6360000002 HC RX W HCPCS: Performed by: NURSE ANESTHETIST, CERTIFIED REGISTERED

## 2021-11-11 PROCEDURE — 3700000001 HC ADD 15 MINUTES (ANESTHESIA): Performed by: ORTHOPAEDIC SURGERY

## 2021-11-11 PROCEDURE — 7100000001 HC PACU RECOVERY - ADDTL 15 MIN: Performed by: ORTHOPAEDIC SURGERY

## 2021-11-11 PROCEDURE — 2580000003 HC RX 258: Performed by: ANESTHESIOLOGY

## 2021-11-11 PROCEDURE — 7100000010 HC PHASE II RECOVERY - FIRST 15 MIN: Performed by: ORTHOPAEDIC SURGERY

## 2021-11-11 PROCEDURE — C1713 ANCHOR/SCREW BN/BN,TIS/BN: HCPCS | Performed by: ORTHOPAEDIC SURGERY

## 2021-11-11 PROCEDURE — C1762 CONN TISS, HUMAN(INC FASCIA): HCPCS | Performed by: ORTHOPAEDIC SURGERY

## 2021-11-11 PROCEDURE — 6370000000 HC RX 637 (ALT 250 FOR IP): Performed by: ANESTHESIOLOGY

## 2021-11-11 PROCEDURE — 6360000002 HC RX W HCPCS: Performed by: ORTHOPAEDIC SURGERY

## 2021-11-11 PROCEDURE — 3600000004 HC SURGERY LEVEL 4 BASE: Performed by: ORTHOPAEDIC SURGERY

## 2021-11-11 PROCEDURE — 7100000000 HC PACU RECOVERY - FIRST 15 MIN: Performed by: ORTHOPAEDIC SURGERY

## 2021-11-11 PROCEDURE — 2500000003 HC RX 250 WO HCPCS: Performed by: NURSE ANESTHETIST, CERTIFIED REGISTERED

## 2021-11-11 DEVICE — ANCHOR TEND 8 FOR REGENETEN BIOINDUCTIVE IMPL SYS: Type: IMPLANTABLE DEVICE | Site: SHOULDER | Status: FUNCTIONAL

## 2021-11-11 DEVICE — BONE ANCHORS 3 WITH ARTHROSCOPIC DELIVERY SYSTEM ADVANCED
Type: IMPLANTABLE DEVICE | Site: SHOULDER | Status: FUNCTIONAL
Brand: BONE ANCHORS WITH ARTHROSCOPIC DELIVERY SYSTEM - ADVANCED

## 2021-11-11 DEVICE — IMPLANTABLE DEVICE
Type: IMPLANTABLE DEVICE | Site: SHOULDER | Status: FUNCTIONAL
Brand: BIOINDUCTIVE IMPLANT WITH ARTHROSCOPIC DELIVERY SYSTEM - LARGE

## 2021-11-11 DEVICE — ANCHOR SUT L14.7MM DIA5.5MM BIOCOMPOSITE W/ 3 SZ 2: Type: IMPLANTABLE DEVICE | Site: SHOULDER | Status: FUNCTIONAL

## 2021-11-11 DEVICE — ANCHOR SUTURE BIOCOMP 4.75X19.1 MM SWIVELOCK C: Type: IMPLANTABLE DEVICE | Site: SHOULDER | Status: FUNCTIONAL

## 2021-11-11 RX ORDER — LIDOCAINE HYDROCHLORIDE 10 MG/ML
0.3 INJECTION, SOLUTION EPIDURAL; INFILTRATION; INTRACAUDAL; PERINEURAL
Status: DISCONTINUED | OUTPATIENT
Start: 2021-11-11 | End: 2021-11-11 | Stop reason: HOSPADM

## 2021-11-11 RX ORDER — ONDANSETRON 2 MG/ML
INJECTION INTRAMUSCULAR; INTRAVENOUS PRN
Status: DISCONTINUED | OUTPATIENT
Start: 2021-11-11 | End: 2021-11-11 | Stop reason: SDUPTHER

## 2021-11-11 RX ORDER — DOCUSATE SODIUM 100 MG/1
100 CAPSULE, LIQUID FILLED ORAL 2 TIMES DAILY PRN
Qty: 10 CAPSULE | Refills: 0 | Status: SHIPPED | OUTPATIENT
Start: 2021-11-11 | End: 2021-11-16

## 2021-11-11 RX ORDER — OXYCODONE HYDROCHLORIDE AND ACETAMINOPHEN 5; 325 MG/1; MG/1
1 TABLET ORAL EVERY 6 HOURS PRN
Qty: 28 TABLET | Refills: 0 | Status: SHIPPED | OUTPATIENT
Start: 2021-11-11 | End: 2021-11-18

## 2021-11-11 RX ORDER — LABETALOL HYDROCHLORIDE 5 MG/ML
5 INJECTION, SOLUTION INTRAVENOUS EVERY 10 MIN PRN
Status: DISCONTINUED | OUTPATIENT
Start: 2021-11-11 | End: 2021-11-11 | Stop reason: HOSPADM

## 2021-11-11 RX ORDER — SODIUM CHLORIDE 9 MG/ML
25 INJECTION, SOLUTION INTRAVENOUS PRN
Status: DISCONTINUED | OUTPATIENT
Start: 2021-11-11 | End: 2021-11-11 | Stop reason: HOSPADM

## 2021-11-11 RX ORDER — OXYCODONE HYDROCHLORIDE AND ACETAMINOPHEN 5; 325 MG/1; MG/1
1 TABLET ORAL PRN
Status: COMPLETED | OUTPATIENT
Start: 2021-11-11 | End: 2021-11-11

## 2021-11-11 RX ORDER — HYDRALAZINE HYDROCHLORIDE 20 MG/ML
5 INJECTION INTRAMUSCULAR; INTRAVENOUS EVERY 10 MIN PRN
Status: DISCONTINUED | OUTPATIENT
Start: 2021-11-11 | End: 2021-11-11 | Stop reason: HOSPADM

## 2021-11-11 RX ORDER — MEPERIDINE HYDROCHLORIDE 50 MG/ML
12.5 INJECTION INTRAMUSCULAR; INTRAVENOUS; SUBCUTANEOUS EVERY 5 MIN PRN
Status: DISCONTINUED | OUTPATIENT
Start: 2021-11-11 | End: 2021-11-11 | Stop reason: HOSPADM

## 2021-11-11 RX ORDER — PROMETHAZINE HYDROCHLORIDE 25 MG/ML
6.25 INJECTION, SOLUTION INTRAMUSCULAR; INTRAVENOUS
Status: DISCONTINUED | OUTPATIENT
Start: 2021-11-11 | End: 2021-11-11 | Stop reason: HOSPADM

## 2021-11-11 RX ORDER — MIDAZOLAM HYDROCHLORIDE 1 MG/ML
INJECTION INTRAMUSCULAR; INTRAVENOUS PRN
Status: DISCONTINUED | OUTPATIENT
Start: 2021-11-11 | End: 2021-11-11 | Stop reason: SDUPTHER

## 2021-11-11 RX ORDER — DEXAMETHASONE SODIUM PHOSPHATE 10 MG/ML
INJECTION INTRAMUSCULAR; INTRAVENOUS PRN
Status: DISCONTINUED | OUTPATIENT
Start: 2021-11-11 | End: 2021-11-11 | Stop reason: SDUPTHER

## 2021-11-11 RX ORDER — ROCURONIUM BROMIDE 10 MG/ML
INJECTION, SOLUTION INTRAVENOUS PRN
Status: DISCONTINUED | OUTPATIENT
Start: 2021-11-11 | End: 2021-11-11 | Stop reason: SDUPTHER

## 2021-11-11 RX ORDER — PHENYLEPHRINE HCL IN 0.9% NACL 1 MG/10 ML
SYRINGE (ML) INTRAVENOUS PRN
Status: DISCONTINUED | OUTPATIENT
Start: 2021-11-11 | End: 2021-11-11 | Stop reason: SDUPTHER

## 2021-11-11 RX ORDER — MAGNESIUM HYDROXIDE 1200 MG/15ML
LIQUID ORAL CONTINUOUS PRN
Status: COMPLETED | OUTPATIENT
Start: 2021-11-11 | End: 2021-11-11

## 2021-11-11 RX ORDER — PROPOFOL 10 MG/ML
INJECTION, EMULSION INTRAVENOUS PRN
Status: DISCONTINUED | OUTPATIENT
Start: 2021-11-11 | End: 2021-11-11 | Stop reason: SDUPTHER

## 2021-11-11 RX ORDER — OXYCODONE HYDROCHLORIDE AND ACETAMINOPHEN 5; 325 MG/1; MG/1
2 TABLET ORAL PRN
Status: COMPLETED | OUTPATIENT
Start: 2021-11-11 | End: 2021-11-11

## 2021-11-11 RX ORDER — MORPHINE SULFATE 2 MG/ML
2 INJECTION, SOLUTION INTRAMUSCULAR; INTRAVENOUS EVERY 5 MIN PRN
Status: DISCONTINUED | OUTPATIENT
Start: 2021-11-11 | End: 2021-11-11 | Stop reason: HOSPADM

## 2021-11-11 RX ORDER — SENNA PLUS 8.6 MG/1
1 TABLET ORAL 2 TIMES DAILY
Qty: 10 TABLET | Refills: 0 | Status: SHIPPED | OUTPATIENT
Start: 2021-11-11 | End: 2021-11-16

## 2021-11-11 RX ORDER — ONDANSETRON 2 MG/ML
4 INJECTION INTRAMUSCULAR; INTRAVENOUS PRN
Status: DISCONTINUED | OUTPATIENT
Start: 2021-11-11 | End: 2021-11-11 | Stop reason: HOSPADM

## 2021-11-11 RX ORDER — ONDANSETRON 4 MG/1
4 TABLET, FILM COATED ORAL EVERY 8 HOURS PRN
Qty: 15 TABLET | Refills: 0 | Status: SHIPPED | OUTPATIENT
Start: 2021-11-11 | End: 2021-11-16

## 2021-11-11 RX ORDER — SODIUM CHLORIDE 0.9 % (FLUSH) 0.9 %
5-40 SYRINGE (ML) INJECTION EVERY 12 HOURS SCHEDULED
Status: DISCONTINUED | OUTPATIENT
Start: 2021-11-11 | End: 2021-11-11 | Stop reason: HOSPADM

## 2021-11-11 RX ORDER — MORPHINE SULFATE 2 MG/ML
1 INJECTION, SOLUTION INTRAMUSCULAR; INTRAVENOUS EVERY 5 MIN PRN
Status: DISCONTINUED | OUTPATIENT
Start: 2021-11-11 | End: 2021-11-11 | Stop reason: HOSPADM

## 2021-11-11 RX ORDER — SODIUM CHLORIDE 0.9 % (FLUSH) 0.9 %
5-40 SYRINGE (ML) INJECTION PRN
Status: DISCONTINUED | OUTPATIENT
Start: 2021-11-11 | End: 2021-11-11 | Stop reason: HOSPADM

## 2021-11-11 RX ORDER — ASPIRIN 325 MG
325 TABLET, DELAYED RELEASE (ENTERIC COATED) ORAL 2 TIMES DAILY
Qty: 28 TABLET | Refills: 0 | Status: SHIPPED | OUTPATIENT
Start: 2021-11-11 | End: 2021-12-02

## 2021-11-11 RX ORDER — FENTANYL CITRATE 50 UG/ML
INJECTION, SOLUTION INTRAMUSCULAR; INTRAVENOUS PRN
Status: DISCONTINUED | OUTPATIENT
Start: 2021-11-11 | End: 2021-11-11 | Stop reason: SDUPTHER

## 2021-11-11 RX ORDER — MIDAZOLAM HYDROCHLORIDE 1 MG/ML
INJECTION INTRAMUSCULAR; INTRAVENOUS
Status: COMPLETED
Start: 2021-11-11 | End: 2021-11-11

## 2021-11-11 RX ORDER — CLINDAMYCIN PHOSPHATE 900 MG/50ML
900 INJECTION INTRAVENOUS EVERY 8 HOURS
Status: DISCONTINUED | OUTPATIENT
Start: 2021-11-11 | End: 2021-11-11 | Stop reason: HOSPADM

## 2021-11-11 RX ORDER — DIPHENHYDRAMINE HYDROCHLORIDE 50 MG/ML
12.5 INJECTION INTRAMUSCULAR; INTRAVENOUS
Status: DISCONTINUED | OUTPATIENT
Start: 2021-11-11 | End: 2021-11-11 | Stop reason: HOSPADM

## 2021-11-11 RX ORDER — GLYCOPYRROLATE 0.2 MG/ML
INJECTION INTRAMUSCULAR; INTRAVENOUS PRN
Status: DISCONTINUED | OUTPATIENT
Start: 2021-11-11 | End: 2021-11-11 | Stop reason: SDUPTHER

## 2021-11-11 RX ORDER — SODIUM CHLORIDE, SODIUM LACTATE, POTASSIUM CHLORIDE, CALCIUM CHLORIDE 600; 310; 30; 20 MG/100ML; MG/100ML; MG/100ML; MG/100ML
INJECTION, SOLUTION INTRAVENOUS CONTINUOUS
Status: DISCONTINUED | OUTPATIENT
Start: 2021-11-11 | End: 2021-11-11 | Stop reason: HOSPADM

## 2021-11-11 RX ORDER — FENTANYL CITRATE 50 UG/ML
INJECTION, SOLUTION INTRAMUSCULAR; INTRAVENOUS
Status: COMPLETED
Start: 2021-11-11 | End: 2021-11-11

## 2021-11-11 RX ORDER — DOXYCYCLINE HYCLATE 100 MG
100 TABLET ORAL 2 TIMES DAILY
Qty: 6 TABLET | Refills: 0 | Status: SHIPPED | OUTPATIENT
Start: 2021-11-11 | End: 2021-11-14

## 2021-11-11 RX ORDER — LIDOCAINE HYDROCHLORIDE 20 MG/ML
INJECTION, SOLUTION INFILTRATION; PERINEURAL PRN
Status: DISCONTINUED | OUTPATIENT
Start: 2021-11-11 | End: 2021-11-11 | Stop reason: SDUPTHER

## 2021-11-11 RX ADMIN — DEXAMETHASONE SODIUM PHOSPHATE 10 MG: 10 INJECTION INTRAMUSCULAR; INTRAVENOUS at 12:33

## 2021-11-11 RX ADMIN — ROCURONIUM BROMIDE 50 MG: 10 SOLUTION INTRAVENOUS at 12:20

## 2021-11-11 RX ADMIN — LIDOCAINE HYDROCHLORIDE 100 MG: 20 INJECTION, SOLUTION INFILTRATION; PERINEURAL at 12:19

## 2021-11-11 RX ADMIN — Medication 50 MCG: at 13:39

## 2021-11-11 RX ADMIN — PROPOFOL 200 MG: 10 INJECTION, EMULSION INTRAVENOUS at 12:20

## 2021-11-11 RX ADMIN — Medication 50 MCG: at 13:06

## 2021-11-11 RX ADMIN — GLYCOPYRROLATE 0.2 MG: 0.2 INJECTION, SOLUTION INTRAMUSCULAR; INTRAVENOUS at 12:55

## 2021-11-11 RX ADMIN — Medication 100 MCG: at 12:49

## 2021-11-11 RX ADMIN — SODIUM CHLORIDE, POTASSIUM CHLORIDE, SODIUM LACTATE AND CALCIUM CHLORIDE: 600; 310; 30; 20 INJECTION, SOLUTION INTRAVENOUS at 14:40

## 2021-11-11 RX ADMIN — ROCURONIUM BROMIDE 20 MG: 10 SOLUTION INTRAVENOUS at 13:56

## 2021-11-11 RX ADMIN — MIDAZOLAM HYDROCHLORIDE 2 MG: 2 INJECTION, SOLUTION INTRAMUSCULAR; INTRAVENOUS at 11:28

## 2021-11-11 RX ADMIN — Medication 100 MCG: at 12:32

## 2021-11-11 RX ADMIN — Medication 50 MCG: at 13:08

## 2021-11-11 RX ADMIN — ONDANSETRON 4 MG: 2 INJECTION INTRAMUSCULAR; INTRAVENOUS at 12:33

## 2021-11-11 RX ADMIN — OXYCODONE HYDROCHLORIDE AND ACETAMINOPHEN 1 TABLET: 5; 325 TABLET ORAL at 16:45

## 2021-11-11 RX ADMIN — FENTANYL CITRATE 100 MCG: 50 INJECTION INTRAMUSCULAR; INTRAVENOUS at 11:28

## 2021-11-11 RX ADMIN — SODIUM CHLORIDE, POTASSIUM CHLORIDE, SODIUM LACTATE AND CALCIUM CHLORIDE: 600; 310; 30; 20 INJECTION, SOLUTION INTRAVENOUS at 11:00

## 2021-11-11 RX ADMIN — ROCURONIUM BROMIDE 10 MG: 10 SOLUTION INTRAVENOUS at 12:58

## 2021-11-11 ASSESSMENT — PULMONARY FUNCTION TESTS
PIF_VALUE: 23
PIF_VALUE: 21
PIF_VALUE: 18
PIF_VALUE: 1
PIF_VALUE: 20
PIF_VALUE: 18
PIF_VALUE: 23
PIF_VALUE: 17
PIF_VALUE: 23
PIF_VALUE: 21
PIF_VALUE: 23
PIF_VALUE: 23
PIF_VALUE: 21
PIF_VALUE: 18
PIF_VALUE: 22
PIF_VALUE: 22
PIF_VALUE: 23
PIF_VALUE: 18
PIF_VALUE: 21
PIF_VALUE: 23
PIF_VALUE: 23
PIF_VALUE: 21
PIF_VALUE: 23
PIF_VALUE: 21
PIF_VALUE: 23
PIF_VALUE: 22
PIF_VALUE: 22
PIF_VALUE: 23
PIF_VALUE: 22
PIF_VALUE: 23
PIF_VALUE: 22
PIF_VALUE: 23
PIF_VALUE: 22
PIF_VALUE: 17
PIF_VALUE: 22
PIF_VALUE: 23
PIF_VALUE: 21
PIF_VALUE: 23
PIF_VALUE: 2
PIF_VALUE: 23
PIF_VALUE: 22
PIF_VALUE: 1
PIF_VALUE: 22
PIF_VALUE: 22
PIF_VALUE: 23
PIF_VALUE: 21
PIF_VALUE: 22
PIF_VALUE: 22
PIF_VALUE: 21
PIF_VALUE: 22
PIF_VALUE: 22
PIF_VALUE: 16
PIF_VALUE: 22
PIF_VALUE: 21
PIF_VALUE: 23
PIF_VALUE: 21
PIF_VALUE: 20
PIF_VALUE: 21
PIF_VALUE: 22
PIF_VALUE: 21
PIF_VALUE: 22
PIF_VALUE: 21
PIF_VALUE: 22
PIF_VALUE: 23
PIF_VALUE: 21
PIF_VALUE: 23
PIF_VALUE: 0
PIF_VALUE: 23
PIF_VALUE: 22
PIF_VALUE: 22
PIF_VALUE: 23
PIF_VALUE: 20
PIF_VALUE: 20
PIF_VALUE: 23
PIF_VALUE: 22
PIF_VALUE: 23
PIF_VALUE: 23
PIF_VALUE: 21
PIF_VALUE: 22
PIF_VALUE: 23
PIF_VALUE: 23
PIF_VALUE: 22
PIF_VALUE: 22
PIF_VALUE: 16
PIF_VALUE: 22
PIF_VALUE: 23
PIF_VALUE: 22
PIF_VALUE: 23
PIF_VALUE: 23
PIF_VALUE: 16
PIF_VALUE: 23
PIF_VALUE: 20
PIF_VALUE: 23
PIF_VALUE: 5
PIF_VALUE: 23
PIF_VALUE: 2
PIF_VALUE: 23
PIF_VALUE: 21
PIF_VALUE: 22
PIF_VALUE: 21
PIF_VALUE: 22
PIF_VALUE: 18
PIF_VALUE: 22
PIF_VALUE: 21
PIF_VALUE: 23
PIF_VALUE: 0
PIF_VALUE: 22
PIF_VALUE: 23
PIF_VALUE: 22
PIF_VALUE: 20
PIF_VALUE: 21
PIF_VALUE: 22
PIF_VALUE: 23
PIF_VALUE: 21
PIF_VALUE: 1
PIF_VALUE: 23
PIF_VALUE: 22
PIF_VALUE: 20
PIF_VALUE: 1
PIF_VALUE: 23
PIF_VALUE: 16
PIF_VALUE: 14
PIF_VALUE: 23
PIF_VALUE: 22
PIF_VALUE: 19
PIF_VALUE: 1
PIF_VALUE: 20
PIF_VALUE: 23
PIF_VALUE: 23
PIF_VALUE: 22
PIF_VALUE: 1
PIF_VALUE: 21
PIF_VALUE: 22
PIF_VALUE: 3
PIF_VALUE: 23
PIF_VALUE: 22
PIF_VALUE: 20
PIF_VALUE: 22
PIF_VALUE: 23
PIF_VALUE: 22
PIF_VALUE: 13
PIF_VALUE: 22
PIF_VALUE: 18
PIF_VALUE: 1
PIF_VALUE: 21
PIF_VALUE: 23
PIF_VALUE: 22
PIF_VALUE: 23
PIF_VALUE: 23
PIF_VALUE: 1
PIF_VALUE: 22
PIF_VALUE: 23
PIF_VALUE: 23

## 2021-11-11 ASSESSMENT — PAIN - FUNCTIONAL ASSESSMENT: PAIN_FUNCTIONAL_ASSESSMENT: 0-10

## 2021-11-11 ASSESSMENT — PAIN SCALES - GENERAL: PAINLEVEL_OUTOF10: 4

## 2021-11-11 NOTE — ANESTHESIA PRE PROCEDURE
Department of Anesthesiology  Preprocedure Note       Name:  Guero Overton   Age:  66 y.o.  :  1950                                          MRN:  [id number]         Date:  2021      Surgeon: Zoila Art):  Lavon Lee MD    Procedure: Procedure(s):  VIDEO ARTHROSCOPY RIGHT SHOULDER, DEBRIDEMENT, DECOMPRESSION, ROTATOR CUFF REPAIR WITH PATCH AUGMENTATION    Medications prior to admission:   Prior to Admission medications    Medication Sig Start Date End Date Taking?  Authorizing Provider   aspirin 81 MG EC tablet Take 81 mg by mouth daily   Yes Historical Provider, MD   Omega-3 Fatty Acids (FISH OIL PO) Take by mouth daily   Yes Historical Provider, MD   VITAMIN D PO Take by mouth daily   Yes Historical Provider, MD   predniSONE (DELTASONE) 5 MG tablet Take 5 mg by mouth daily   Yes Historical Provider, MD   metFORMIN (GLUCOPHAGE) 500 MG tablet Take 1,000 mg by mouth 2 times daily  21  Yes Historical Provider, MD   lisinopril (PRINIVIL;ZESTRIL) 2.5 MG tablet Take 2.5 mg by mouth daily  5/3/21  Yes Historical Provider, MD   rosuvastatin (CRESTOR) 10 MG tablet Take 10 mg by mouth daily  10/15/21   Historical Provider, MD   ACCU-CHEK GUIDE strip  3/22/21   Historical Provider, MD   Accu-Chek Softclix Lancets [facility]  3/22/21   Historical Provider, MD       Current medications:    Current Facility-Administered Medications   Medication Dose Route Frequency Provider Last Rate Last Admin    lactated ringers infusion   IntraVENous Continuous Cady Calzada  mL/hr at 21 1100 New Bag at 21 1100    sodium chloride flush 0.9 % injection 5-40 mL  5-40 mL IntraVENous 2 times per day Cady Calzada MD        sodium chloride flush 0.9 % injection 5-40 mL  5-40 mL IntraVENous PRN Cady Cazlada MD        0.9 % sodium chloride infusion  25 mL IntraVENous PRN Cady Calzada MD        lidocaine PF 1 % injection 0.3 mL  0.3 mL IntraDERmal Once PRN Cady Calzada MD        clindamycin (CLEOCIN) 900 mg in dextrose 5 % 50 mL IVPB  900 mg IntraVENous Q8H Marcela Richter MD           Allergies: Allergies   Allergen Reactions    Penicillins        Problem List:    Patient Active Problem List   Diagnosis Code    Actinic keratosis L57.0    AR (allergic rhinitis) J30.9    Degenerative disc disease, cervical M50.30    Hyperlipidemia associated with type 2 diabetes mellitus ([facility] [address] E11.69, E78.5    Type 2 diabetes mellitus without complication, without long-term current use of insulin (HCC) E11.9    Overweight E66.3    Pain of right upper extremity M79.601       Past Medical History:        Diagnosis Date    Diabetes ([facility] [address]     Hyperlipidemia     Polymyalgia rheumatica ([facility] [address]        Past Surgical History:        Procedure Laterality Date    CARPAL TUNNEL RELEASE      COLONOSCOPY         Social History:    Social History     Tobacco Use    Smoking status: Former Smoker     Start date: 1968     Quit date: 1970     Years since quittin.0    Smokeless tobacco: Never Used   Substance Use Topics    Alcohol use: Yes     Comment: 2-3 drinks per month                                Counseling given: Not Answered      Vital Signs (Current):   Vitals:    21 1008 21 1039   BP:  (!) 144/99   Pulse:  89   Resp:  16   Temp:  97.4 °F (36.3 °C)   SpO2:  97%   Weight: 205 lb (93 kg)    Height: 5' 10\" (1.778 m)                                               BP Readings from Last 3 Encounters:   21 (!) 144/99       NPO Status: Time of last liquid consumption:                         Time of last solid consumption:                         Date of last liquid consumption: 11/10/21                        Date of last solid food consumption: 11/10/21    BMI:   Wt Readings from Last 3 Encounters:   21 205 lb (93 kg)   10/26/21 205 lb (93 kg)   21 205 lb (93 kg)     Body mass index is 29.41 kg/m².     CBC: No results found for: WBC, RBC, HGB, HCT, MCV, RDW, PLT    CMP: No results found for: NA, K, CL, CO2, BUN, CREATININE, GFRAA, AGRATIO, LABGLOM, GLUCOSE, PROT, CALCIUM, BILITOT, ALKPHOS, AST, ALT    POC Tests:   Recent Labs     21  1059   POCGLU 150*       Coags: No results found for: PROTIME, INR, APTT    HCG (If Applicable): No results found for: PREGTESTUR, PREGSERUM, HCG, HCGQUANT     ABGs: No results found for: PHART, PO2ART, PSG0ZNA, DPW5GTQ, BEART, Y6FZEBFR     Type & Screen (If Applicable):  No results found for: LABABO, LABRH    Drug/Infectious Status (If Applicable):  No results found for: HIV, HEPCAB    COVID-19 Screening (If Applicable):   Lab Results   Component Value Date    COVID19 Not Detected 2021           Anesthesia Evaluation  Patient summary reviewed no history of anesthetic complications:   Airway: Mallampati: II  TM distance: >3 FB   Neck ROM: full  Mouth opening: > = 3 FB Dental: normal exam         Pulmonary:Negative Pulmonary ROS and normal exam  breath sounds clear to auscultation                             Cardiovascular:Negative CV ROS  Exercise tolerance: good (>4 METS),           Rhythm: regular  Rate: normal                    Neuro/Psych:   Negative Neuro/Psych ROS              GI/Hepatic/Renal: Neg GI/Hepatic/Renal ROS            Endo/Other: Negative Endo/Other ROS   (+) Diabetes, : arthritis:., .                 Abdominal:             Vascular: negative vascular ROS. Other Findings:          Pre-Operative Diagnosis: Traumatic complete tear of right rotator cuff, initial encounter [S46.011A]    66 y.o.   BMI:  Body mass index is 29.41 kg/m².      Vitals:    21 1008 21 1039   BP:  (!) 144/99   Pulse:  89   Resp:  16   Temp:  97.4 °F (36.3 °C)   SpO2:  97%   Weight: 205 lb (93 kg)    Height: 5' 10\" (1.778 m)        Allergies   Allergen Reactions    Penicillins        Social History     Tobacco Use    Smoking status: Former Smoker     Start date: 1968     Quit date: 1970     Years since quittin.0    Smokeless tobacco: Never Used   Substance Use Topics    Alcohol use: Yes     Comment: 2-3 drinks per month       LABS:    CBC  No results found for: WBC, HGB, HCT, PLT  RENAL  No results found for: NA, K, CL, CO2, BUN, CREATININE, GLUCOSE  COAGS  No results found for: PROTIME, INR, APTT          Anesthesia Plan      general     ASA 2     (I discussed with the patient the risks and benefits of regional anesthesia (inlcuding infection, bleeding, damage to nerves and surrounding structures) PIV, General, IV Narcotics, PACU. All questions were answered the patient agrees with the plan and wishes to proceed.)  Induction: intravenous.                           Lois Mack MD   11/11/2021

## 2021-11-11 NOTE — PROGRESS NOTES
Discharge instructions reviewed with patient/responsible adult and understanding verbalized. Discharge instructions signed and copies given. discharged home with belongings.

## 2021-11-11 NOTE — ANESTHESIA PROCEDURE NOTES
Peripheral Block    Patient location during procedure: pre-op  Staffing  Performed: anesthesiologist   Anesthesiologist: Fabian Shipman MD  Preanesthetic Checklist  Completed: patient identified, IV checked, site marked, risks and benefits discussed, surgical consent, monitors and equipment checked, pre-op evaluation, timeout performed, anesthesia consent given, oxygen available and patient being monitored  Peripheral Block  Patient position: sitting  Prep: ChloraPrep  Patient monitoring: cardiac monitor, continuous pulse ox, frequent blood pressure checks and IV access  Block type: Brachial plexus  Laterality: right  Injection technique: single-shot  Guidance: ultrasound guided  Local infiltration: lidocaine  Infiltration strength: 1 %  Dose: 3 mL  Interscalene  Provider prep: mask and sterile gloves  Local infiltration: lidocaine  Needle  Needle gauge: 21 G  Needle length: 10 cm  Needle localization: ultrasound guidance  Assessment  Injection assessment: negative aspiration for heme, no paresthesia on injection and local visualized surrounding nerve on ultrasound  Paresthesia pain: none  Slow fractionated injection: yes  Hemodynamics: stable  Additional Notes  Immediately prior to procedure a \"time out\" was called to verify the correct patient, allergies, laterality, procedure and equipment. Time out performed with  RN    Local Anesthetic: 0.5 %  Bupivacaine   Amount: 15 ml and exparel 10ml  in 5 ml increments after negative aspiration each time. Anterior scalene and middle scalene muscles, upper, middle and lower trunks of the brachial plexus are identified, the tip of the needle and the spread of the local anesthetic around the brachial plexus are visualized. The Brachial plexus appeared to be anatomically normal and there were no abnormal pathologically findings seen.          Reason for block: post-op pain management and at surgeon's request

## 2021-11-12 NOTE — ANESTHESIA POSTPROCEDURE EVALUATION
Department of Anesthesiology  Postprocedure Note    Patient: Ervin French  MRN: [id number]  YOB: 1950  Date of evaluation: 11/11/2021    Procedure Summary     Date: 11/11/21 Room / Location: [facility] AT [place] [address] [address] / [facility]    Anesthesia Start: 7547 Anesthesia Stop: 5060    Procedure: DIAGNOSTIC VIDEO ARTHROSCOPY RIGHT SHOULDER, ARTHROSCOPIC DEBRIDEMENT, ARTHROSCOPIC DECOMPRESSION, ARTHROSCOPIC LYSIS OF ADHESIONS, ARTHROSCOPIC BICEPS TENODESIS, ARTHROSCOPIC ROTATOR CUFF REPAIR WITH PATCH AUGMENTATION (Right Shoulder) Diagnosis:       Traumatic complete tear of right rotator cuff, initial encounter      (Traumatic complete tear of right rotator cuff, initial encounter [S46.011A])    Surgeons: Mehdi Ramirez MD Responsible Provider: Cristal Lange MD    Anesthesia Type: general ASA Status: 2        Anesthesia Type: general    Luda Phase I: Luda Score: 5    Luda Phase II: Luda Score: 10    Last vitals: Reviewed and per EMR flowsheets.      Anesthesia Post Evaluation     Anesthetic Problems: no   Cardiovascular System Stable: yes  Respiratory Function: Airway Patent yes  ETT no  Ventilator no  Level of consciousness: awake, alert and oriented  Post-op pain: adequate analgesia  Hydration Adequate: yes  Nausea/Vomiting:no  Other Issues:     Trent Ji MD

## 2021-11-12 NOTE — OP NOTE
[facility]                 Arnaldo Dawn                                OPERATIVE REPORT    PATIENT NAME: Augustina Padron                   :        1950  MED REC NO:   [id number]                          ROOM:  ACCOUNT NO:   [de-identified]                           ADMIT DATE: 2021  PROVIDER:     Ade Vega MD    DATE OF PROCEDURE:  2021    PREOPERATIVE DIAGNOSIS:  Massive rotator cuff tear, right shoulder. POSTOPERATIVE DIAGNOSES:  Massive rotator cuff tear, right shoulder,  with biceps tendinopathy and instability and upper subscapularis tendon  tear with impingement; extensive posttraumatic adhesions. PROCEDURES:  Right shoulder examination under anesthesia; diagnostic  arthroscopy; arthroscopic extensive debridement of labrum, biceps stump,  rotator cuff, rotator interval, subacromial bursa; arthroscopic  extensive lysis of adhesions including release of articular-sided and  subdeltoid, subacromial adhesions; arthroscopic subacromial  decompression with acromioplasty; arthroscopic biceps tenodesis;  arthroscopic repair of massive two-tendon rotator cuff tear using  layered repair and collagen patch augmentation. Modifier 22 applies because the patient had a massive two-tendon rotator  cuff tear with retracted level of the glenoid and separate lamina had to  be repaired independently. This had roughly double the time that it  would take to repair an otherwise uncomplicated tear. ANESTHESIA:  General anesthesia, interscalene block. IV FLUIDS:  1000 mL of crystalloids. ESTIMATED BLOOD LOSS:  25 mL. COMPLICATIONS:  None.     SURGEON:  Ade Vega MD    ASSISTANTS:  Gin Blakely MD, and Laura Bustillos MD.    IMPLANTS:  Arthrex BioComposite Corkscrew anchor 5.5 fully threaded  anchor x5 and Arthrex BioComposite SwiveLock anchor 4.75 mm x1 and one  [employer]hew large Regeneten collagen patch graft.    FINDINGS:  Examination under anesthesia revealed no obvious focal motion  deficits. Diagnostic arthroscopy revealed early osteoarthritis and  massive two-tendon rotator cuff tear with separate superficial and deep  lamina, retracted level of glenoid. Labrum was macerated. There was  degenerative labral fraying. The biceps was unstable and partially  torn. There was upper subscapularis tendon tear, scarring within the  rotator interval with extensive adhesions. There was a degenerative  tendon stump, needed to be debrided. We were able to repair the tendon. There was some tendon loss anteriorly and so, we used a collagen patch  augmentation. Type 2 acromion, amenable to acromioplasty, was addressed  in that manner. BRIEF HISTORY AND PRESENTING ILLNESS:  The patient is a 64-year-old  gentleman with a longstanding history of right shoulder pain and  weakness. He had had a trauma to the shoulder which worsened his  function. MRI showed a massive rotator cuff tear. Muscle quality still  was not very atrophic and so, we recommended surgical repair in the form  of rotator cuff repair. He understood likely it is patch augmentation. He understood that concurrent lesions would be addressed as encountered. He understood risks such as bleeding, infection, anesthetic risk, injury  to nerve and blood vessels, stiffness, weakness, incomplete pain relief,  and need for further surgery. He understood all this and wished to  proceed. He gave informed consent. He was scheduled on an elective  basis after appropriate preoperative medical clearance. DESCRIPTION OF PROCEDURE:  On the date of the procedure, brought back to  the operating room, placed supine on the operating table. General  anesthesia was established. Preoperative antibiotics and interscalene  block were given in the holding area. Under anesthesia, exam was  carried out with the findings as noted above.   The patient was  positioned using a [address] chair positioner in the sitting position. All pressure points were padded. The patient's right shoulder and arm  were prepped and draped in a standard manner for arthroscopic shoulder  surgery. We used a TerraPass Spider arm vasques for arm position. We began diagnostic arthroscopy. The arthroscope was introduced into  the glenohumeral joint through a standard posterior portal.  Systematic  diagnostic arthroscopy ensued with findings as noted above. We  established anterior portal within the rotator interval.  We had the  arthroscopic shaver across the metal cannula. This was used to debride  some labral fraying. We opened up the rotator interval with cautery  down to the base of coracoid. Brought the arm internally rotated. We  could see the upper subscapularis tendon tear. The biceps was probed  and was tendinopathic. After dividing labrum and doing a rotator  interval release, we elected to proceed with tenotomy as a first step  for tenodesis. We percutaneously placed an 18-gauge spinal needle  through the biceps tendon. Passed #1 PDS suture through that needle. We retrieved the suture anteriorly and withdrew the needle. More medial  to that, we used an upbiting basket to release the biceps tendon off  the supraglenoid tubercle. We debrided the stump using a shaver. We  also did some articular-sided release with cautery. We cauterized some  synovial fronds and synovitis. We appreciated the size of the tear. We then established a lateral portal under direct visualization and  dilated with arthroscopic shaver and performed a thorough bursectomy. We also debrided some edges of the tendon that were quite atrophic. We  could see the degenerative tendon stump and this was removed. We used  cautery to remove it and then used a live to lightly abrade the rotator  cuff footprint. We went back with the arthroscope laterally and brought  the shaver from posterior.   We resected all the posterior scar and  subacromial bursa. We also resected the periosteum at the undersurface  of the acromion and gently teased off the coracoacromial ligament,  exposed a type 2 acromion. We used an acromionizer live to perform our  acromioplasty. We beveled the lateral edge of the acromion to address  any outlet stenosis and also removed the osteophyte medially on the  lateral side of the [facility] joint. We inspected the rotator cuff tear. We  could see there was separate lamina. We established accessory  anterolateral portal.  We went back with the arthroscope posteriorly. Through the lateral portal, we grasped onto the tendon and then used the  shaver to remove the scar. We also used cautery to remove some  adhesions right at the muscle-tendon junction. We  the lamina. We felt that we could repair these independently. We also got the biceps tendon out of the way, so we could expose the  subscapularis. We placed an anchor right at the top of the bicipital  groove. This was an Arthrex BioComposite Corkscrew anchor, loaded three  #2 FiberWire sutures. Through a 30-mm PassPort cannula laterally,  we retrieved one suture limb and passed it through the biceps tendon  using a Imaging3 Scorpion suture passer. We now had a simple stitch. We tied our knots arthroscopically. We used a Revo knot followed by  alternating half hitches reversing the direction of the throw and post  with each consecutive half hitch. Arthrex suture cutter was used to  leave uniform 3-mm suture tails. Second suture was passed through the  biceps tendon with the arm maximally externally rotated. This was  passed antegrade with a Humpback Scorpion. A simple stitch was  retrieved and we tied our knots arthroscopically with the arm maximally  externally rotated. This helped reduce the subscapularis and comma  tissue. The third suture was later used to repair the anterior aspect  of the supraspinatus.     We now brought the arm internally rotated and started to repair from  posterior anterior. We placed two Arthrex BioComposite Corkscrew  anchors, closed the articular margin, perhaps 5 to 6 mm away from the  articular margin. These were loaded with three #2 FiberWire sutures. We retrieved the suture sequentially starting from posterior. We loaded  onto an ArthDigital Labion suture passer. We passed the sutures  in a simple manner through the deep lamina. We parked the suture tails  out of the accessory posterolateral portal that we had created for  anchor placement. One limb from the second suture was also passed  anterior to the first and the third suture from the posterior anchor was  passed anterior and so forth. Repeated the process identically with the  suture from the second anchor, so we had fixed simple stitches, these  were tied arthroscopically in the manner as described above. Suture  cutter was used to leave short tails. Anteriorly, we placed one more anchor centered between the far anterior  one and the second one that we had just placed. This was a fourth  anchor in a row and the sutures were passed to the anterior leaflets. This was now much more mobile because of the comma tissue repair. We  had three simple stitches and then, the remaining suture from that far  anterior anchor was also passed antegrade to the far anterior aspect of  the supraspinatus. Now, we had four simple stitches which were tied  with the arm in mid external rotation. There was still the superficial  lamina posteriorly to repair. We knew this was shortened despite doing  heroic releases on the bursal side. We placed an anchor far lateral to  the footprint and passed two of the sutures in a horizontal mattress  fashion. These helped deliver the tendon and reduce it. We tied our  knots arthroscopically.   These four tails were then placed through a  SwiveLock anchor placed posterolateral to get maximal coverage. We were  very happy with how things lay. The core suture and the remaining  suture from that 5.5 anchor were removed and one of these sutures was  passed in side-to-side fashion to close a small split far posterior. We  had very nice repair of that side-to-side split with a simple stitch  with the knots tied arthroscopically out the posterior portal.    At this point, went back with the arthroscope posteriorly. Removed the  PassPort cannula. We wanted to augment the repair anteriorly where  there was some exposed footprint and so, we deployed a large Regeneten  collagen patch graft from lateral.  This was fixed to tendon using six  PLLA staples into bone using two bone-tendon staples anterolateral and  central lateral avoiding the 5.5 anchors we had placed laterally. We  had very nice fixation, good coverage. We documented our work using  arthroscopic images, using fine tuning at the acromioplasty and at this  point, irrigated the subacromial space, closed the arthroscopic portals  anterior, posterior, lateral as well as anterolateral and posterolateral  and another portal which was a mid anterolateral.  These six portals  were closed with 4-0 Monocryl closure and Prineo dressing. Sterile  compressive dressing was applied. The arm was placed in a padded soft  brace. The patient was repositioned in the supine position before this,  promptly awoken from anesthesia, having tolerated the procedure well,  and taken from the operating room to the recovery room in satisfactory  condition. PLAN:  The patient will be discharged on oral analgesics with  instructions to enroll in a delayed rehab program, follow up in the  office in one week.         Soumya Gagnon MD    D: 11/11/2021 15:02:28       T: 11/11/2021 22:04:39     [email or web address]  Job#: [id number]     Doc#: [id number]    CC:

## 2021-11-18 ENCOUNTER — OFFICE VISIT (OUTPATIENT)
Dept: ORTHOPEDIC SURGERY | Age: 71
End: 2021-11-18

## 2021-11-18 VITALS — WEIGHT: 205 LBS | BODY MASS INDEX: 29.35 KG/M2 | HEIGHT: 70 IN

## 2021-11-18 DIAGNOSIS — Z98.890 S/P RIGHT ROTATOR CUFF REPAIR: Primary | ICD-10-CM

## 2021-11-18 PROCEDURE — 99024 POSTOP FOLLOW-UP VISIT: CPT | Performed by: ORTHOPAEDIC SURGERY

## 2021-11-18 NOTE — PROGRESS NOTES
History of Present Illness:  Isaias Mena is a pleasant 66 y.o. male who presents for a post operative visit. He is one week out following a right shoulder arthroscopy for debridement, subacromial decompression, repair of massive two-tendon rotator cuff tear with collagen patch augmentation and arthroscopic biceps tenodesis on 11/11/21. Overall He is doing okay and feels that their pain is well controlled with current pain medications. He has been compliant with wearing the UltraSling brace at all times. He plans to do physical therapy at the [facility] in [place]. He denies fevers, chills, numbness, tingling, and shortness of breath. Medical History:  Patient's medications, allergies, past medical, surgical, social and family histories were reviewed and updated as appropriate. Review of Systems  A 14 point review of systems was completed by the patient on 6/1/21 and is available in the media section of the scanned medical record and was reviewed on 11/18/2021. Vital Signs:  Vitals:       General/Appearance: Alert and oriented and in no apparent distress. Skin:  There are no skin lesions, cellulitis, or extreme edema. The patient has warm and well-perfused Bilateral upper extremities with brisk capillary refill. Right Shoulder Exam:    Inspection: Shoulder incision portals are clean, dry and intact and well approximated. The Prineo dressing is still in place with the exception of the posterior portal. This was previously removed. Mild ecchymosis and swelling are present as can be expected. There is no erythema, drainage or other signs of infection    Palpation:  No crepitus to gentle motion    Active Range of Motion: Deferred    Passive Range of Motion:  Deferred    Strength:  Deferred    Special Tests:  Deferred. Neurovascular: Sensation to light touch is intact, no motor deficits, palpable radial pulses 2+    Radiology:     No new XR obtained at this time.       Assessment :  Mr. Dane Huitron Mark Avendano MD, PhD  11/18/2021

## 2021-11-18 NOTE — LETTER
Shoulder Elbow Rehabilitation Referral    Patient Name: Shreyas Houston      YOB: 1950    Diagnosis:   1. S/P right rotator cuff repair    Biceps tenodesis     DOS: 11/11/21    Precautions: RTC/Biceps    Post Op Instructions:  [] Continuous passive motion (CPM)  [x]Passive Elbow range of motion  [] Exercise in plane of scapula   []  Strengthening     [] Pulley and instruction    [x] Home exercise program (copy to patient)   [] Sling when arm at risk  [x] Sling or brace at all times   [] AAROM: Forward elevation to 90            [] AAROM: External rotation to 20    [] Isometric external rotator strengthening [] AAROM: internal rotation: up the back  [x] Isometric abductor strengthening  [] AAROM: Internal abduction     [] Isometric internal rotator strengthening [] AAROM: cross-body adduction             Stretching:     Strengthening:  [] Four quadrant (FE, ER, IR, CBA)  [] Rotator cuff (ER, IR, Abd)  [] Forward Elevation    [] External Rotators     [] External Rotation    [] Internal Rotators  [] Internal Rotation: up/back   [] Abductors     [] Internal Rotation: supine in abduction  [] Flexors  [] Cross-body abduction    [] Extensors  [x] Pendulum (FE, Abd/Add, cw/ccw)  [x] Scapular Stabilizers   [] Wall-walking (FE, Abd)    [x] Shoulder shrugs     [] Table slides      [x] Rhomboid pinch  [] Elbow (flex, ext, pron, sup)    [] Lat.  Pull downs     [] Medial epicondylitis program    [] Forward punch   [] Lateral epicondylitis program    [] Internal rotators     [] Progressive resistive exercises  [] Bench Press        [] Bench press plus  Activities:     [] Lateral pull-downs  [] Rowing     [] Progressive two-hand supine press  [] Stepper/Exercise bike   [] Biceps: curls/supination  [] Swimming  [] Water exercises    Modalities: PRN    Return to Sport:  [] Ultrasound     [] Plyometrics  [] Iontophoresis     [] Rhythmic stabilization  [] Moist heat     [] Core strengthening   [] Massage     [] Sports specific program:   [x] Cryotherapy      [] Electrical stimulation     [] Paraffin  [] Whirlpool  [] TENS    [x] Home exercise program (copy to patient). Perform exercises for:   15     minutes    2-3      times/day  [x] Supervised physical therapy  Frequency: []  1x week  [x] 2x week  [] 3x week  [] Other:   Duration: [] 2 weeks   [] 4 weeks  [x] 6 weeks  [] Other:     Additional Instructions:   ***        Samer S. Merton Spatz, MD, PhD

## 2021-12-02 ENCOUNTER — OFFICE VISIT (OUTPATIENT)
Dept: ORTHOPEDIC SURGERY | Age: 71
End: 2021-12-02

## 2021-12-02 VITALS — HEIGHT: 70 IN | BODY MASS INDEX: 29.35 KG/M2 | WEIGHT: 205 LBS

## 2021-12-02 DIAGNOSIS — Z98.890 S/P RIGHT ROTATOR CUFF REPAIR: Primary | ICD-10-CM

## 2021-12-02 DIAGNOSIS — Z98.890 S/P ROTATOR CUFF SURGERY: ICD-10-CM

## 2021-12-02 PROCEDURE — 99024 POSTOP FOLLOW-UP VISIT: CPT | Performed by: ORTHOPAEDIC SURGERY

## 2021-12-02 NOTE — PROGRESS NOTES
Chief Complaint    Shoulder Pain (F/U RIGHT SHOUDLER)      History of Present Illness:  Silas Loera is a pleasant, 66 y.o., male, here today for follow up of right shoulder. He is one week out following a right shoulder arthroscopy for debridement, subacromial decompression, repair of massive two-tendon rotator cuff tear with collagen patch augmentation and arthroscopic biceps tenodesis on 11/11/21. He is doing well overall, and feels that his pain is well controlled. He denies fevers, chills, numbness, tingling, and shortness of breath. He reports no new injuries or setbacks. Medical History:  Patient's medications, allergies, past medical, surgical, social and family histories were reviewed and updated as appropriate. Patient has had no medical changes since last evaluated 6/1/21        Review of Systems  A 14 point review of systems was completed by the patient on and is available in the media section of the scanned medical record and was reviewed on 12/2/2021. The review is negative with the exception of those things mentioned in the HPI and Past Medical History    Vital Signs: There were no vitals filed for this visit. General/Appearance: Alert and oriented and in no apparent distress. Skin:  There are no skin lesions, cellulitis, or extreme edema. The patient has warm and well-perfused Bilateral upper extremities with brisk capillary refill. Right shoulder Exam:  Inspection: Surgical wound is well-healed no gross deformities, no signs of infection. Palpation: Deferred    Active Range of Motion: Deferred    Passive Range of Motion: Deferred    Strength: Deferred    Special Tests:   No Erich muscle deformity.     Neurovascular: Sensation to light touch is intact, no motor deficits, palpable radial pulses 2+    Assessment : Mr. Silas Loera is a pleasant 66 y.o. patient who is one week out following a right shoulder arthroscopy for debridement, subacromial decompression, repair of massive two-tendon rotator cuff tear with collagen patch augmentation and arthroscopic biceps tenodesis on 11/11/21. He will be on a delayed rehab protocol due to his age and the size of the tear.          Impression:  Encounter Diagnoses   Name Primary?  S/P right rotator cuff repair Yes    S/P rotator cuff surgery        Office Procedures:  Orders Placed This Encounter   Procedures    External Referral To Physical Therapy     Referral Priority:   Routine     Referral Type:   Eval and Treat     Referral Reason:   Specialty Services Required     Requested Specialty:   Physical Therapy     Number of Visits Requested:   1       Treatment Plan: Silas Loera is doing well. His pain is well controlled. We recommend that he wear the UltraSling only when he is doing outside home in a crowded areas. He is allowed to start driving, and we recommend He continue in physical therapy . A new physical therapy letter was documented in EPIC today. We will see Lisa Smiley back in 3 weeks and/or as needed. All questions were answered to patient's satisfaction and He was encouraged to call with any further questions or concerns. Silas Loera is in agreement with this plan. Sincerely,  Catherine Pearce MD   Orthopedic fellow  [address] [facility] and [address] [facility]     Email: [email or web address]      12/02/21  11:16 AM      The encounter with Silas Loera was carried out by myself, Dr. Alis Restrepo, who personally examined the patient and reviewed the plan. This dictation was performed with a verbal recognition program (DRAGON) and it was checked for errors. It is possible that there are still dictated errors within this office note. If so, please bring any errors to my attention for an addendum.   All efforts were made to ensure that this office note is accurate.   _______________  I was physically present and personally supervised the Orthopaedic Sports Medicine Fellow in the evaluation and development of a treatment plan for this patient. I personally interviewed the patient and performed a physical examination. In addition, I discussed the patient's condition and treatment options with them. I have also reviewed and agree with the past medical, family and social history unless otherwise noted. All of the patient's questions were answered. Kwame Maravilla MD, PhD  12/2/2021

## 2021-12-02 NOTE — LETTER
Physical Therapy Rehabilitation Referral    Patient Name:  Isaias Mena      YOB: 1950    Diagnosis:    1. S/P right rotator cuff repair    2. S/P rotator cuff surgery      S/p right shoulder rotator cuff repair   Precautions:     [x] Evaluate and Treat    Post Op Instructions:  [] Continuous passive motion (CPM) [x] Elbow ROM  Active  [x] Exercise in plane of scapula  []  Strengthening     [] Pulley and instruction   [x] Home exercise program (copy to patient)   [] Sling when arm at risk  [] Sling or brace at all times   [x] AAROM: Forward elevation to  140            [x] AAROM: External rotation  To  40    [] Isometric external rotator strengthening [] AAROM: internal rotation: up the back  [x] Isometric abductor strengthening  [] AAROM: Internal abduction   [] Isometric internal rotator strengthening [] AAROM: cross-body adduction             Stretching:     Strengthening:  [] Four quadrant (FE, ER, IR, CBA)  [] Rotator cuff (ER, IR, Abd)  [] Forward Elevation    [] External Rotators     [] External Rotation    [] Internal Rotators  [] Internal Rotation: up/back   [] Abductors     [] Internal Rotation: supine in abduction  [] Sleeper Stretch    [] Flexors  [] Cross-body abduction    [] Extensors  [] Pendulum (FE, Abd/Add, cw/ccw)  [x] Scapular Stabilizers   [] Wall-walking (FE, Abd)        [x] Shoulder shrugs     [] Table slides (FE)                [x] Rhomboid pinch  [] Elbow (flex, ext, pron, sup)        [] Lat.  Pull downs     [] Medial epicondylitis program       [] Forward punch   [] Lateral epicondylitis program       [] Internal rotators     [] Progressive resistive exercises  [] Bench Press        [] Bench press plus  Activities:     [] Lateral pull-downs  [] Rowing     [] Progressive two-hand supine press  [] Stepper/Exercise bike   [] Biceps: curls/supination  [] Swimming  [] Water exercises    Modalities:     Return to Sport:  [x] Of Choice      [] Plyometrics  [] Ultrasound     [] Rhythmic stabilization  [] Iontophoresis    [] Core strengthening   [] Moist heat     [] Sports specific program:   [] Massage         [x] Cryotherapy      [] Electrical stimulation     [] Paraffin  [] Whirlpool  [] TENS    [x] Home exercise program (copy to patient).         Perform exercises for:   15     minutes    3      times/day  [x] Supervised physical therapy  Frequency: []  1x week  [x] 2x week  [] 3x week  [] Other:   Duration: [] 2 weeks   [] 4 weeks  [x] 6 weeks  [] Other:     Additional Instructions:       Skylar Lizarraga MD   Clinical Fellow [facility]

## 2021-12-08 ENCOUNTER — TELEPHONE (OUTPATIENT)
Dept: ORTHOPEDIC SURGERY | Age: 71
End: 2021-12-08

## 2021-12-08 NOTE — TELEPHONE ENCOUNTER
General Question     Subject: Patient requesting clarification on order for physical therapy.   Patient Venora Pear  Contact Number: [phone]

## 2021-12-30 ENCOUNTER — OFFICE VISIT (OUTPATIENT)
Dept: ORTHOPEDIC SURGERY | Age: 71
End: 2021-12-30

## 2021-12-30 VITALS — WEIGHT: 205 LBS | BODY MASS INDEX: 29.35 KG/M2 | HEIGHT: 70 IN

## 2021-12-30 DIAGNOSIS — Z98.890 S/P RIGHT ROTATOR CUFF REPAIR: Primary | ICD-10-CM

## 2021-12-30 PROCEDURE — 99024 POSTOP FOLLOW-UP VISIT: CPT | Performed by: ORTHOPAEDIC SURGERY

## 2021-12-30 NOTE — LETTER
Physical Therapy Rehabilitation Referral    Patient Name:  Tula Lundborg      YOB: 1950    Diagnosis:    No diagnosis found. S/p right shoulder rotator cuff repair   Precautions:     [x] Evaluate and Treat    Post Op Instructions:  [] Continuous passive motion (CPM) [x] Elbow ROM  Active  [x] Exercise in plane of scapula  []  Strengthening     [] Pulley and instruction   [x] Home exercise program (copy to patient)   [] Sling when arm at risk  [] Sling or brace at all times   [x] AAROM: Forward elevation to  140            [x] AAROM: External rotation  To  40    [] Isometric external rotator strengthening [] AAROM: internal rotation: up the back  [x] Isometric abductor strengthening  [] AAROM: Internal abduction   [] Isometric internal rotator strengthening [] AAROM: cross-body adduction             Stretching:     Strengthening:  [] Four quadrant (FE, ER, IR, CBA)  [] Rotator cuff (ER, IR, Abd)  [] Forward Elevation    [] External Rotators     [] External Rotation    [] Internal Rotators  [] Internal Rotation: up/back   [] Abductors     [] Internal Rotation: supine in abduction  [] Sleeper Stretch    [] Flexors  [] Cross-body abduction    [] Extensors  [] Pendulum (FE, Abd/Add, cw/ccw)  [x] Scapular Stabilizers   [] Wall-walking (FE, Abd)        [x] Shoulder shrugs     [] Table slides (FE)                [x] Rhomboid pinch  [] Elbow (flex, ext, pron, sup)        [] Lat.  Pull downs     [] Medial epicondylitis program       [] Forward punch   [] Lateral epicondylitis program       [] Internal rotators     [] Progressive resistive exercises  [] Bench Press        [] Bench press plus  Activities:     [] Lateral pull-downs  [] Rowing     [] Progressive two-hand supine press  [] Stepper/Exercise bike   [x] Biceps: curls/supination  [] Swimming  [] Water exercises    Modalities:     Return to Sport:  [x] Of Choice      [] Plyometrics  [] Ultrasound     [] Rhythmic stabilization  [] Iontophoresis    [] Core strengthening   [] Moist heat     [] Sports specific program:   [] Massage         [x] Cryotherapy      [] Electrical stimulation     [] Paraffin  [] Whirlpool  [] TENS    [x] Home exercise program (copy to patient).         Perform exercises for:   15     minutes    3      times/day  [x] Supervised physical therapy  Frequency: [x]  1x week  [] 2x week  [] 3x week  [] Other:   Duration: [] 2 weeks   [] 4 weeks  [x] 6 weeks  [] Other:     Additional Instructions:

## 2021-12-30 NOTE — PROGRESS NOTES
Chief Complaint    Follow-up (R shoulder)      History of Present Illness:  Scott Cartagena is a pleasant, 66 y.o., male, here today for follow up of right shoulder. He is 7 weeks out following a right shoulder arthroscopy for debridement, subacromial decompression, repair of massive two-tendon rotator cuff tear with collagen patch augmentation and arthroscopic biceps tenodesis on 11/11/21. He is doing well overall, and feels that his pain is well controlled. He is no longer taking any pain killers. He is going to PT at [facility] in [place], [place], once per week. He denies fevers, chills, numbness, tingling, and shortness of breath. He reports no new injuries or setbacks. Medical History:  Patient's medications, allergies, past medical, surgical, social and family histories were reviewed and updated as appropriate. ROS done 6/1/21  Patient has had no medical changes since last evaluated        Review of Systems  A 14 point review of systems was completed by the patient on and is available in the media section of the scanned medical record and was reviewed on 12/30/2021. The review is negative with the exception of those things mentioned in the HPI and Past Medical History    Vital Signs: There were no vitals filed for this visit. General/Appearance: Alert and oriented and in no apparent distress. Skin:  There are no skin lesions, cellulitis, or extreme edema. The patient has warm and well-perfused Bilateral upper extremities with brisk capillary refill. Right shoulder Exam:  Inspection: incisions well healed. no signs of infection. Palpation: no tenderness    Active Range of Motion: , Abd 160, ER 30. IR L2    Passive Range of Motion: same as active    Strength: Deferred    Special Tests:   No Erich muscle deformity. Neurovascular: Sensation to light touch is intact, no motor deficits, palpable radial pulses 2+    Assessment : Mr. Scott Cartagena is a pleasant 66 y.o. patient who is one week out following a right shoulder arthroscopy for debridement, subacromial decompression, repair of massive two-tendon rotator cuff tear with collagen patch augmentation and arthroscopic biceps tenodesis on 11/11/21. He will be on a delayed rehab protocol due to his age and the size of the tear. Impression:  Encounter Diagnosis   Name Primary?  S/P right rotator cuff repair Yes       Office Procedures:  Orders Placed This Encounter   Procedures    Amb External Referral To Physical Therapy     Referral Priority:   Routine     Referral Type:   Consult for Advice and Opinion     Referral Reason:   Patient Preference     Requested Specialty:   Physical Therapy     Number of Visits Requested:   1       Treatment Plan: Tula Lundborg is doing well. His pain is well controlled. We recommend that he comes out of the sling. We would like him to continue physical therapy once per week. His motion is great and can start working on biceps curls. A new prescription was sent to physical therapy. and we recommend He continue in physical therapy . A new physical therapy letter was documented in [facility] today. All questions were answered to patient's satisfaction and He was encouraged to call with any further questions or concerns. Tula Lundborg is in agreement with this plan. We will see him back in 4 weeks. Kerwin Hickey MD  [facility] Clinical fellow      12/30/21  3:17 PM    This dictation was performed with a verbal recognition program [facility]) and it was checked for errors. It is possible that there are still dictated errors within this office note. If so, please bring any areas to my attention for an addendum. All efforts were made to ensure that this office note is accurate.  _________________________  I was physically present and personally supervised the Orthopaedic Sports Medicine Fellow in the evaluation and development of a treatment plan for this patient.  I personally interviewed the patient and performed a physical examination. In addition, I discussed the patient's condition and treatment options with them. I have also reviewed and agree with the past medical, family and social history unless otherwise noted. All of the patient's questions were answered. Kwame Ferrer MD, PhD  12/30/2021

## 2021-12-30 NOTE — Clinical Note
[address] [address]  Phone: [phone]  Fax: [phone]    Darrel Castro MD        December 30, 2021     Patient: Lito Rolon   YOB: 1950   Date of Visit: 12/30/2021       To Whom It May Concern: It is my medical opinion that Lito Rolon {Work release (duty restriction):58368}. If you have any questions or concerns, please don't hesitate to call.     Sincerely,        Darrel Castro MD

## 2022-02-08 ENCOUNTER — OFFICE VISIT (OUTPATIENT)
Dept: ORTHOPEDIC SURGERY | Age: 72
End: 2022-02-08

## 2022-02-08 VITALS — WEIGHT: 205 LBS | HEIGHT: 70 IN | BODY MASS INDEX: 29.35 KG/M2

## 2022-02-08 DIAGNOSIS — Z98.890 S/P RIGHT ROTATOR CUFF REPAIR: Primary | ICD-10-CM

## 2022-02-08 PROCEDURE — 99024 POSTOP FOLLOW-UP VISIT: CPT | Performed by: ORTHOPAEDIC SURGERY

## 2022-02-08 NOTE — LETTER
program:   [x] Cryotherapy      [] Electrical stimulation     [] Paraffin  [] Whirlpool  [] TENS    [x] Home exercise program (copy to patient). Perform exercises for:   15     minutes    2-3      times/day  [x] Supervised physical therapy  Frequency: []  1x week  [x] 2x week  [] 3x week  [] Other:   Duration: [] 2 weeks   [] 4 weeks  [x] 6 weeks  [] Other:     Additional Instructions:   AROM as tolerated  He may begin rotator cuff strengthening at 15 weeks postop        Samer LEANNE Baker MD, PhD

## 2022-02-08 NOTE — PROGRESS NOTES
Chief Complaint    Post-Op Check (right shoulder)      History of Present Illness:  Saurav Garcia is a pleasant, 66 y.o., male, here today for follow up of his right shoulder. This patient is now 13 weeks s/p a right shoulder arthroscopy for debridement, subacromial decompression, repair of massive two-tendon rotator cuff tear with collagen patch augmentation and arthroscopic biceps tenodesis on 11/11/21. He has continued in physical therapy at [facility] in [place]. He states after his therapy session this past week he has had burning into his biceps region. He states they increased the repetitions of his exercises from 10 to 15. He reports no new injuries or setbacks. Pain Assessment  Location of Pain: Shoulder  Location Modifiers: Right  Severity of Pain: 8  Quality of Pain: Other (Comment)  Duration of Pain: Other (Comment)  Frequency of Pain: Other (Comment)      Medical History:  Patient's medications, allergies, past medical, surgical, social and family histories were reviewed and updated as appropriate. No notes on file    Review of Systems  A 14 point review of systems was completed by the patient on 6/1/21 and is available in the media section of the scanned medical record and was reviewed on 2/8/2022. The review is negative with the exception of those things mentioned in the HPI and Past Medical History    Vital Signs: There were no vitals filed for this visit. General/Appearance: Alert and oriented and in no apparent distress. Skin:  There are no skin lesions, cellulitis, or extreme edema. The patient has warm and well-perfused Bilateral upper extremities with brisk capillary refill. Right Shoulder Exam:  Inspection: Incision portals are healing well. No gross deformities, no signs of infection. Palpation: Non-tender to palpate, subacromial clicking present    Active Range of Motion:   Forward Elevation 155 with some pain on arm descent, Internal Rotation L1    Passive Range of Motion: Deferred    Strength: External Rotation 3/5    Special Tests:  No Erich muscle deformity. Neurovascular: Sensation to light touch is intact, no motor deficits, palpable radial pulses 2+      Radiology:     No new XR obtained at this time. Assessment :  Mr. Saurav Garcia is a pleasant, 66 y.o. patient who is 13 weeks s/p a right shoulder arthroscopy for debridement, subacromial decompression, repair of massive two-tendon rotator cuff tear with collagen patch augmentation and arthroscopic biceps tenodesis on 11/11/21. Impression:  Encounter Diagnosis   Name Primary?  S/P right rotator cuff repair Yes       Office Procedures:  Orders Placed This Encounter   Procedures    Amb External Referral To Physical Therapy     Referral Priority:   Routine     Referral Type:   Consult for Advice and Opinion     Referral Reason:   Patient Preference     Requested Specialty:   Physical Therapy     Number of Visits Requested:   1       Treatment Plan: We had a candid discussion with Saurav Garcia regarding his recovery after surgery. We believe that his recent bout of shoulder soreness is likely due to overuse in therapy. We will have him go back to 10 repetitions and progress as tolerated. We recommend this patient continue in physical therapy. A new physical therapy letter was documented in [facility] today. We will have him begin rotator cuff strengthening prior to his next visit in this office. If his pain has not improved we will consider an MRI at his next visit. He is agreeable to this. We will see [place] back in 4 weeks and/or as needed. All questions were answered to patient's satisfaction and He was encouraged to call with any further questions or concerns. Saurav Garcia is in agreement with this plan.     2/8/2022  11:09 AM    Christopher Armstrong ATC  [facility] [address] [facility]    During this examination, I, Christopher Armstrong ATC, functioned as a scribe for Dr. Walter Vera. The history taking and physical examination were performed by Dr. Kelsey You. All counseling during the appointment was performed between the patient and Dr. Kelsey You. 2/8/22  ______________  I, Dr. Walter Vera, personally performed the services described in this documentation as described by Ari Vincent ATC in my presence, and it is both accurate and complete. Kwame You MD, PhD  2/8/2022

## 2022-02-22 ENCOUNTER — TELEPHONE (OUTPATIENT)
Dept: ORTHOPEDIC SURGERY | Age: 72
End: 2022-02-22

## 2022-02-22 NOTE — TELEPHONE ENCOUNTER
Appointment Request     Patient requesting earlier appointment: YES  Appointment offered to patient:  PATIENT SCHEDULED/ EARLIER PER PHYSICAL THERAPIST  Patient Contact Number:[phone]

## 2022-03-03 ENCOUNTER — OFFICE VISIT (OUTPATIENT)
Dept: ORTHOPEDIC SURGERY | Age: 72
End: 2022-03-03
Payer: COMMERCIAL

## 2022-03-03 VITALS — WEIGHT: 205 LBS | BODY MASS INDEX: 29.35 KG/M2 | HEIGHT: 70 IN

## 2022-03-03 DIAGNOSIS — Z98.890 S/P RIGHT ROTATOR CUFF REPAIR: Primary | ICD-10-CM

## 2022-03-03 PROCEDURE — 99213 OFFICE O/P EST LOW 20 MIN: CPT | Performed by: ORTHOPAEDIC SURGERY

## 2022-03-03 NOTE — PROGRESS NOTES
[facility]  History and Physical  Shoulder Pain    Date:  3/3/2022    Name:  Jered Bennett  Address:  [address] [place] [address]    :  1950      Age:   66 y.o.    SSN:  [id number]      Medical Record Number:  <[id number]>    Reason for Visit:    Shoulder Pain (F/U RIGHT SHOULDER)      HPI:   Jered Bennett is a 66 y.o. male who presents to our office today for follow up of the right shoulder pain. This patient is now 3.5 months post op from a right shoulder arthroscopy for debridement, subacromial decompression, repair of massive two-tendon rotator cuff tear with collagen patch augmentation and arthroscopic biceps tenodesis on 21. He reports he was doing his strength exercises last week and began having a lot of pain. The same week he started noticing a lot of bruising over the biceps region. He decided to put his sling on as he was concerned that he may have done something to injure his right shoulder. He denies numbness or tingling. Pain Assessment  Location of Pain: Shoulder  Location Modifiers: Right  Severity of Pain: 3  Quality of Pain: Sharp  Duration of Pain: Persistent  Frequency of Pain: Intermittent  Aggravating Factors: Other (Comment),Exercise  Limiting Behavior: Yes  Relieving Factors: Rest,Ice  Work-Related Injury: No  Are there other pain locations you wish to document?: No    No notes on file    Review of Systems:  A 14 point review of systems available in the scanned medical record as documented by the patient. The review is negative with the exception of those things mentioned in the History of Present Illness and Past Medical History. Past Medical History:  Patient's medications, allergies, past medical, surgical, social and family histories were reviewed and updated as appropriate. Allergies:   Allergies   Allergen Reactions    Penicillins        Physical Exam:  There were no vitals filed for this visit. General: Mac Snellen is a healthy and well appearing 66 y.o. male who is sitting comfortably in our office in acute distress. Skin:  There are no skin lesions, cellulitis, or extreme edema. The patient has warm and well-perfused Bilateral upper extremities with brisk capillary refill. Eyes: Extra-ocular muscles intact  Mouth: Oral mucosa moist. No perioral lesions  Pulm: Respirations unlabored and regular. Neuro: Alert and oriented x3    right Shoulder Exam:  Inspection: Incisions fully healed. He does have some mild swelling and ecchymosis over the biceps region. No gross deformities, no signs of infection. There is slight asymmetry with his biceps. Palpation: He has tenderness over the midshaft of the humerus. Active Range of Motion: Forward elevation of 150, external rotation with elbow at the side 30, internal rotation to the back is L1    Passive Range of Motion: deferred    Strength: External rotation with resistance with elbow at the side -4/5    Special Tests: There is slight asymmetry with the biceps. Neurovascular: Sensation to light touch is intact, no motor deficits, palpable radial pulses 2+    Additional Examinations:    Examination of the contralateral extremity does not show any tenderness, deformity or injury. Range of motion is unremarkable. There is no gross instability. There are no rashes, ulcerations or lesions. Strength and tone are normal.      Radiographic:  3 xray views of the right  shoulder including True AP in internal and external and axillary lateral were taken in our office today reveal no fractures, dislocations, visible tumors, or signs of acute trauma. Assessment:  Mac Snellen is a 66 y.o. male is now 3.5 months post op from a right shoulder arthroscopy for debridement, subacromial decompression, repair of massive two-tendon rotator cuff tear with collagen patch augmentation and arthroscopic biceps tenodesis on 11/11/21.   He may have sustained an injury to the biceps tendon. Impression:  Encounter Diagnosis   Name Primary?  S/P right rotator cuff repair Yes       Office Procedures:  Orders Placed This Encounter   Procedures    XR SHOULDER RIGHT (MIN 2 VIEWS)     Standing Status:   Future     Number of Occurrences:   1     Standing Expiration Date:   3/3/2023     Order Specific Question:   Reason for exam:     Answer:   PAIN       Plan:   Some mild asymmetry with his biceps and he may have injured his biceps however he was given reassurance that this should not have any impact on his overall function. We recommend that he continue work with physical therapy and really focus on the strength program.  Physical therapy orders were updated today. He was asked to discontinue his sling completely. We will we will see him back in 3 to 4 weeks for reassessment. 3/3/2022  10:26 AM      Nicole Germain PA-C  Orthopaedic Sports Medicine Physician Assistant    During this examination, Nicole ROLLINS PA-C, functioned as a scribe for Dr. Leonel Adamson. This dictation was performed with a verbal recognition program ([employer]) and it was checked for errors. It is possible that there are still dictated errors within this office note. If so, please bring any errors to my attention for an addendum. All efforts were made to ensure that this office note is accurate.  _____________  I, Dr. Leonel Adamson, personally performed the services described in this documentation as described by Nicole Germain PA-C in my presence, and it is both accurate and complete. Kwame Bose MD, PhD  3/3/2022

## 2022-03-03 NOTE — LETTER
Physical Therapy Rehabilitation Referral    Patient Name:  Lito Rolon      YOB: 1950    Diagnosis:    1. S/P right rotator cuff repair        Precautions:     [x] Evaluate and Treat    Post Op Instructions:  [] Continuous passive motion (CPM) [] Elbow ROM  [x] Exercise in plane of scapula  []  Strengthening     [x] Pulley and instruction   [x] Home exercise program (copy to patient)   [] Sling when arm at risk  [] Sling or brace at all times   [] AAROM: Forward elevation to  140            [] AAROM: External rotation  To  40    [] Isometric external rotator strengthening [] AAROM: internal rotation: up the back  [x] Isometric abductor strengthening  [] AAROM: Internal abduction   [] Isometric internal rotator strengthening [] AAROM: cross-body adduction             Stretching:     Strengthening:  [x] Four quadrant (FE, ER, IR, CBA)  [x] Rotator cuff (ER, IR, Abd)  [x] Forward Elevation    [] External Rotators     [x] External Rotation    [] Internal Rotators  [x] Internal Rotation: up/back   [] Abductors     [x] Internal Rotation: supine in abduction  [x] Sleeper Stretch    [] Flexors  [x] Cross-body abduction    [] Extensors  [x] Pendulum (FE, Abd/Add, cw/ccw)  [x] Scapular Stabilizers   [x] Wall-walking (FE, Abd)        [x] Shoulder shrugs     [x] Table slides (FE)                [x] Rhomboid pinch  [] Elbow (flex, ext, pron, sup)        [] Lat.  Pull downs     [] Medial epicondylitis program       [] Forward punch   [] Lateral epicondylitis program       [] Internal rotators     [x] Progressive resistive exercises  [] Bench Press        [] Bench press plus  Activities:     [] Lateral pull-downs  [x] Rowing     [x] Progressive two-hand supine press  [] Stepper/Exercise bike   [x] Biceps: curls/supination  [] Swimming  [] Water exercises    Modalities:     Return to Sport:  [x] Of Choice      [] Plyometrics  [] Ultrasound     [] Rhythmic stabilization  [] Iontophoresis    [] Core strengthening   [] Moist heat     [] Sports specific program:   [] Massage         [x] Cryotherapy      [] Electrical stimulation     [] Paraffin  [] Whirlpool  [] TENS    [x] Home exercise program (copy to patient). Perform exercises for:   15     minutes    3      times/day  [x] Supervised physical therapy  Frequency: []  1x week  [x] 2x week  [] 3x week  [] Other:   Duration: [] 2 weeks   [] 4 weeks  [x] 6 weeks  [] Other:     Additional Instructions:     Kwame Da Silva MD, PhD

## 2022-04-14 ENCOUNTER — OFFICE VISIT (OUTPATIENT)
Dept: ORTHOPEDIC SURGERY | Age: 72
End: 2022-04-14
Payer: COMMERCIAL

## 2022-04-14 VITALS — HEIGHT: 70 IN | BODY MASS INDEX: 29.35 KG/M2 | WEIGHT: 205 LBS

## 2022-04-14 DIAGNOSIS — Z98.890 S/P ROTATOR CUFF SURGERY: ICD-10-CM

## 2022-04-14 DIAGNOSIS — Z98.890 S/P RIGHT ROTATOR CUFF REPAIR: Primary | ICD-10-CM

## 2022-04-14 PROCEDURE — 99213 OFFICE O/P EST LOW 20 MIN: CPT | Performed by: ORTHOPAEDIC SURGERY

## 2022-04-14 NOTE — PROGRESS NOTES
12 [facility]  History and Physical  Shoulder Pain    Date:  2022    Name:  Nemo Joseph  Address:  [address] [place] [address] [address] [address]    :  1950      Age:   66 y.o.    SSN:  [id number]      Medical Record Number:  [id number]    Reason for Visit:    Follow-up (Right Shoulder)      HPI:   Nemo Joseph is a 66 y.o. male who presents to our office today for follow up of the right shoulder pain. He underwent right shoulder arthroscopy with debridement, subacromial decompression, repair of massive 2 tendon rotator cuff tear with collagen patch augmentation and biceps tenodesis on 2021. Patient was seen about a month ago after he had injured his biceps. He was having quite a bit of bruising. He reports that that has improved quite a bit. Patient reports that he has been done with physical therapy for some time. Patient reports that he has a history of polymyalgia rheumatica and was taking oral prednisone prior to his surgery which did help with a lot of his body and joint pain. Today the patient reports some discomfort in the right shoulder and also has pain in the left shoulder. He denies any new injuries to her shoulders. Pain Assessment  Location of Pain: Shoulder  Location Modifiers: Right  Severity of Pain: 7  Quality of Pain: Sharp  Duration of Pain: Persistent  Frequency of Pain: Intermittent  Aggravating Factors:  (any movements)  Limiting Behavior: Yes  Relieving Factors: Rest,Nsaids  Work-Related Injury: No  Are there other pain locations you wish to document?: No    No notes on file    Review of Systems:  A 14 point review of systems available in the scanned medical record as documented by the patient. The review is negative with the exception of those things mentioned in the History of Present Illness and Past Medical History.       Past Medical History:  Patient's medications, allergies, past medical, surgical, social and family histories were reviewed and updated as appropriate. Allergies: Allergies   Allergen Reactions    Penicillins        Physical Exam:  Vitals:     General: Chris Pinto is a healthy and well appearing 66 y.o. male who is sitting comfortably in our office in acute distress. Skin:  There are no skin lesions, cellulitis, or extreme edema. The patient has warm and well-perfused Bilateral upper extremities with brisk capillary refill. Eyes: Extra-ocular muscles intact  Mouth: Oral mucosa moist. No perioral lesions  Pulm: Respirations unlabored and regular. Neuro: Alert and oriented x3    right Shoulder Exam:  Inspection:  No gross deformities, no signs of infection. Palpation: No tenderness over the rotator cuff    Active Range of Motion: Forward elevation of 160, abduction of 160, external rotation with elbow at the side 30, internal rotation to the back is T10    Passive Range of Motion: deferred    Strength: External rotation with resistance with elbow at the side 4/5, internal rotation with resistance with elbow at the side +4/5, supraspinatus testing 4/5    Special Tests:   Positive Erich muscle deformity. Neurovascular: Sensation to light touch is intact, no motor deficits, palpable radial pulses 2+    Additional Examinations:    Examination of the contralateral extremity does not show any tenderness, deformity or injury. Range of motion is unremarkable. There is no gross instability. There are no rashes, ulcerations or lesions. Strength and tone are normal.      Radiographic:  X-ray not obtained today. Assessment:  Chris Pinto is a 66 y.o. male underwent right shoulder arthroscopy with debridement, subacromial decompression, repair of massive 2 tendon rotator cuff tear with collagen patch augmentation and arthroscopic biceps tenodesis on 11/11/2021. Impression:  Encounter Diagnoses   Name Primary?     S/P right rotator cuff repair Yes    S/P rotator cuff surgery Office Procedures:  No orders of the defined types were placed in this encounter. Plan:   Sheridan Thibodeaux  has done well in his overall recovery. We will have him fill out a self-assessment questionnaire. We do recommend that he meet up with his rheumatologist to get his medications stabilized. [facility] we can certainly help facilitate that for him. We will see him back on an as-needed basis should his right shoulder flareup. 4/14/2022  5:13 PM      Cristina Plunkett PA-C  Orthopaedic Sports Medicine Physician Assistant    During this examination, Cristina ROLLINS PA-C, functioned as a scribe for Dr. Maranda Jacinto. This dictation was performed with a verbal recognition program (DRAGON) and it was checked for errors. It is possible that there are still dictated errors within this office note. If so, please bring any errors to my attention for an addendum. All efforts were made to ensure that this office note is accurate.  ______________  I, Dr. Maranda Jacinto, personally performed the services described in this documentation as described by Cristina Plunkett PA-C in my presence, and it is both accurate and complete. Kwame Sinha MD, PhD  4/14/2022

## (undated) DEVICE — GLOVE,SURG,SENSICARE,ALOE,LF,PF,7: Brand: MEDLINE

## (undated) DEVICE — 4.5 MM FULL RADIUS STRAIGHT                                    BLADES, POWER/EP-1, YELLOW, PACKAGED                                    6 PER BOX, STERILE: Brand: DYONICS

## (undated) DEVICE — SYRINGE MED 10ML LUERLOCK TIP W/O SFTY DISP

## (undated) DEVICE — NEEDLE SUT PASS FOR ARTHSCP SCORPION

## (undated) DEVICE — 3M™ IOBAN™ 2 ANTIMICROBIAL INCISE DRAPE 6650EZ: Brand: IOBAN™ 2

## (undated) DEVICE — GLOVE SURG SZ 8 L12IN FNGR THK94MIL STD WHT LTX FREE

## (undated) DEVICE — STANDARD HYPODERMIC NEEDLE,POLYPROPYLENE HUB: Brand: MONOJECT

## (undated) DEVICE — SET ADMIN PRIMING 7ML L30IN 7.35LB 20 GTT 2ND RLER CLMP

## (undated) DEVICE — CANNULA ARTHSCP L3CM ID8MM DBL DAM 1 PC MOLD LO PROF FLNG

## (undated) DEVICE — ELECTRODE PT RET AD L9FT HI MOIST COND ADH HYDRGEL CORDED

## (undated) DEVICE — SOLUTION IV IRRIG 500ML 0.9% SODIUM CHL 2F7123

## (undated) DEVICE — 3M™ STERI-DRAPE™ U-DRAPE, LONG 1019: Brand: STERI-DRAPE™

## (undated) DEVICE — WEREWOLF FLOW 90 COBLATION WAND: Brand: COBLATION

## (undated) DEVICE — GLOVE SURG SZ 65 L12IN FNGR THK79MIL GRN LTX FREE

## (undated) DEVICE — SOLUTION IV 1000ML LAC RINGERS PH 6.5 INJ USP VIAFLX PLAS

## (undated) DEVICE — GAUZE,SPONGE,4"X4",16PLY,STRL,LF,10/TRAY: Brand: MEDLINE

## (undated) DEVICE — GLOVE SURG SZ 8 L12IN FNGR THK79MIL GRN LTX FREE

## (undated) DEVICE — TUBE IRRIG L8IN LNG PT W/ CONN FOR PMP SYS REDEUCE

## (undated) DEVICE — TOWEL,OR,DSP,ST,BLUE,STD,8/PK,10PK/CS: Brand: MEDLINE

## (undated) DEVICE — SET GRAV VENT NVENT CK VLV 3 NDL FREE PRT 10 GTT

## (undated) DEVICE — SYSTEM SKIN CLSR 22CM DERMBND PRINEO

## (undated) DEVICE — 40763 BEACH CHAIR HEAD RESTRAINT: Brand: 40763 BEACH CHAIR HEAD RESTRAINT

## (undated) DEVICE — SUTURE PDS II SZ 1 L36IN ABSRB VLT CT L40MM 1/2 CIR TAPR Z359T

## (undated) DEVICE — NEEDLE SUT PASS FOR ROT CUF LABRAL REP MULTFI SCORPION

## (undated) DEVICE — SOLUTION IRRIG 5L LAC R BG

## (undated) DEVICE — 1010 S-DRAPE TOWEL DRAPE 10/BX: Brand: STERI-DRAPE™

## (undated) DEVICE — SUTURE MCRYL SZ 4-0 L18IN ABSRB UD L19MM PS-2 3/8 CIR PRIM Y496G

## (undated) DEVICE — GLOVE SURG SZ 85 L12IN FNGR THK94MIL STD WHT LTX FREE

## (undated) DEVICE — DYONICS 4.0 MM ELITE                                    ACROMIOBLASTER STRAIGHT DISPOSABLE                                    BURRS, SAGE GREEN, 10000 MAXIMUM                                    RPM, PACKAGED 6 PER BOX, STERILE

## (undated) DEVICE — 1810 FOAM BLOCK NEEDLE COUNTER: Brand: DEVON

## (undated) DEVICE — CATHETER IV 20GA L1.25IN PNK FEP SFTY STR HUB RADPQ DISP

## (undated) DEVICE — GLOVE SURG SZ 65 L12IN FNGR THK94MIL STD WHT LTX FREE

## (undated) DEVICE — 3M™ TEGADERM™ TRANSPARENT FILM DRESSING FRAME STYLE, 1624W, 2-3/8 IN X 2-3/4 IN (6 CM X 7 CM), 100/CT 4CT/CASE: Brand: 3M™ TEGADERM™

## (undated) DEVICE — SHOULDER STABILIZATION KIT,                                    DISPOSABLE 12 PER BOX

## (undated) DEVICE — PACK PROCEDURE SURG ARTHSCP CUST

## (undated) DEVICE — DRAPE BEACH CHAIR SHOULDER W/ POUCH 172X100 IN